# Patient Record
Sex: FEMALE | Race: WHITE | Employment: FULL TIME | URBAN - METROPOLITAN AREA
[De-identification: names, ages, dates, MRNs, and addresses within clinical notes are randomized per-mention and may not be internally consistent; named-entity substitution may affect disease eponyms.]

---

## 2017-03-09 ENCOUNTER — TELEPHONE (OUTPATIENT)
Dept: FAMILY MEDICINE CLINIC | Age: 62
End: 2017-03-09

## 2017-03-09 DIAGNOSIS — Z00.00 PHYSICAL EXAM: ICD-10-CM

## 2017-03-09 DIAGNOSIS — R73.02 IGT (IMPAIRED GLUCOSE TOLERANCE): Primary | ICD-10-CM

## 2017-03-09 DIAGNOSIS — Z12.5 SCREENING FOR PROSTATE CANCER: ICD-10-CM

## 2017-04-03 ENCOUNTER — TELEPHONE (OUTPATIENT)
Dept: FAMILY MEDICINE CLINIC | Age: 62
End: 2017-04-03

## 2019-08-21 ENCOUNTER — OFFICE VISIT (OUTPATIENT)
Dept: ORTHOPEDIC SURGERY | Age: 64
End: 2019-08-21
Payer: COMMERCIAL

## 2019-08-21 VITALS
WEIGHT: 192 LBS | HEART RATE: 76 BPM | HEIGHT: 67 IN | SYSTOLIC BLOOD PRESSURE: 147 MMHG | BODY MASS INDEX: 30.13 KG/M2 | DIASTOLIC BLOOD PRESSURE: 85 MMHG

## 2019-08-21 DIAGNOSIS — M23.91 INTERNAL DERANGEMENT OF RIGHT KNEE: ICD-10-CM

## 2019-08-21 DIAGNOSIS — M25.561 ACUTE PAIN OF RIGHT KNEE: Primary | ICD-10-CM

## 2019-08-21 PROCEDURE — 1036F TOBACCO NON-USER: CPT | Performed by: ORTHOPAEDIC SURGERY

## 2019-08-21 PROCEDURE — 1123F ACP DISCUSS/DSCN MKR DOCD: CPT | Performed by: ORTHOPAEDIC SURGERY

## 2019-08-21 PROCEDURE — 99203 OFFICE O/P NEW LOW 30 MIN: CPT | Performed by: ORTHOPAEDIC SURGERY

## 2019-08-21 PROCEDURE — 3017F COLORECTAL CA SCREEN DOC REV: CPT | Performed by: ORTHOPAEDIC SURGERY

## 2019-08-21 PROCEDURE — 1090F PRES/ABSN URINE INCON ASSESS: CPT | Performed by: ORTHOPAEDIC SURGERY

## 2019-08-21 PROCEDURE — 4040F PNEUMOC VAC/ADMIN/RCVD: CPT | Performed by: ORTHOPAEDIC SURGERY

## 2019-08-21 PROCEDURE — G8427 DOCREV CUR MEDS BY ELIG CLIN: HCPCS | Performed by: ORTHOPAEDIC SURGERY

## 2019-08-21 PROCEDURE — G8417 CALC BMI ABV UP PARAM F/U: HCPCS | Performed by: ORTHOPAEDIC SURGERY

## 2019-08-21 PROCEDURE — G8400 PT W/DXA NO RESULTS DOC: HCPCS | Performed by: ORTHOPAEDIC SURGERY

## 2019-08-21 RX ORDER — NAPROXEN SODIUM 220 MG
220 TABLET ORAL 2 TIMES DAILY WITH MEALS
COMMUNITY

## 2019-09-23 ENCOUNTER — TELEPHONE (OUTPATIENT)
Dept: ORTHOPEDIC SURGERY | Age: 64
End: 2019-09-23

## 2019-09-23 DIAGNOSIS — S83.241A TEAR OF MEDIAL MENISCUS OF RIGHT KNEE, CURRENT, UNSPECIFIED TEAR TYPE, INITIAL ENCOUNTER: Primary | ICD-10-CM

## 2019-09-27 ENCOUNTER — TELEPHONE (OUTPATIENT)
Dept: ORTHOPEDIC SURGERY | Age: 64
End: 2019-09-27

## 2021-11-12 ENCOUNTER — OFFICE VISIT (OUTPATIENT)
Dept: PULMONOLOGY | Age: 66
End: 2021-11-12
Payer: COMMERCIAL

## 2021-11-12 VITALS — OXYGEN SATURATION: 96 % | SYSTOLIC BLOOD PRESSURE: 134 MMHG | HEART RATE: 78 BPM | DIASTOLIC BLOOD PRESSURE: 74 MMHG

## 2021-11-12 DIAGNOSIS — R05.9 COUGH: Primary | ICD-10-CM

## 2021-11-12 DIAGNOSIS — R06.02 SHORTNESS OF BREATH: ICD-10-CM

## 2021-11-12 DIAGNOSIS — Z77.098 EXPOSURE TO CHEMICAL INHALATION: ICD-10-CM

## 2021-11-12 DIAGNOSIS — R05.3 CHRONIC COUGH: ICD-10-CM

## 2021-11-12 PROCEDURE — 1090F PRES/ABSN URINE INCON ASSESS: CPT | Performed by: INTERNAL MEDICINE

## 2021-11-12 PROCEDURE — G8427 DOCREV CUR MEDS BY ELIG CLIN: HCPCS | Performed by: INTERNAL MEDICINE

## 2021-11-12 PROCEDURE — G8484 FLU IMMUNIZE NO ADMIN: HCPCS | Performed by: INTERNAL MEDICINE

## 2021-11-12 PROCEDURE — 1036F TOBACCO NON-USER: CPT | Performed by: INTERNAL MEDICINE

## 2021-11-12 PROCEDURE — 4040F PNEUMOC VAC/ADMIN/RCVD: CPT | Performed by: INTERNAL MEDICINE

## 2021-11-12 PROCEDURE — G8400 PT W/DXA NO RESULTS DOC: HCPCS | Performed by: INTERNAL MEDICINE

## 2021-11-12 PROCEDURE — 3017F COLORECTAL CA SCREEN DOC REV: CPT | Performed by: INTERNAL MEDICINE

## 2021-11-12 PROCEDURE — 99204 OFFICE O/P NEW MOD 45 MIN: CPT | Performed by: INTERNAL MEDICINE

## 2021-11-12 PROCEDURE — G8421 BMI NOT CALCULATED: HCPCS | Performed by: INTERNAL MEDICINE

## 2021-11-12 PROCEDURE — 1123F ACP DISCUSS/DSCN MKR DOCD: CPT | Performed by: INTERNAL MEDICINE

## 2021-11-12 ASSESSMENT — ENCOUNTER SYMPTOMS
ABDOMINAL PAIN: 0
CONSTIPATION: 0
DIARRHEA: 0
NAUSEA: 0
SINUS PRESSURE: 0
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
## 2021-11-19 ENCOUNTER — HOSPITAL ENCOUNTER (OUTPATIENT)
Age: 66
Discharge: HOME | DRG: 193 | End: 2021-11-19
Payer: COMMERCIAL

## 2021-11-19 ENCOUNTER — HOSPITAL ENCOUNTER (OUTPATIENT)
Dept: GENERAL RADIOLOGY | Age: 66
Discharge: HOME | DRG: 193 | End: 2021-11-19
Payer: COMMERCIAL

## 2021-11-19 DIAGNOSIS — R05.9 COUGH: ICD-10-CM

## 2021-11-19 LAB
BASOPHILS ABSOLUTE: 0 K/UL (ref 0–0.2)
BASOPHILS RELATIVE PERCENT: 0.4 %
EOSINOPHILS ABSOLUTE: 0.3 K/UL (ref 0–0.6)
EOSINOPHILS RELATIVE PERCENT: 4.3 %
HCT VFR BLD CALC: 42.2 % (ref 36–48)
HEMOGLOBIN: 14 G/DL (ref 12–16)
LYMPHOCYTES ABSOLUTE: 1.1 K/UL (ref 1–5.1)
LYMPHOCYTES RELATIVE PERCENT: 15.8 %
MCH RBC QN AUTO: 28.5 PG (ref 26–34)
MCHC RBC AUTO-ENTMCNC: 33.2 G/DL (ref 31–36)
MCV RBC AUTO: 85.9 FL (ref 80–100)
MONOCYTES ABSOLUTE: 0.4 K/UL (ref 0–1.3)
MONOCYTES RELATIVE PERCENT: 6.4 %
NEUTROPHILS ABSOLUTE: 5.1 K/UL (ref 1.7–7.7)
NEUTROPHILS RELATIVE PERCENT: 73.1 %
PDW BLD-RTO: 14.3 % (ref 12.4–15.4)
PLATELET # BLD: 176 K/UL (ref 135–450)
PMV BLD AUTO: 10.4 FL (ref 5–10.5)
RBC # BLD: 4.92 M/UL (ref 4–5.2)
WBC # BLD: 6.9 K/UL (ref 4–11)

## 2021-11-19 PROCEDURE — 36415 COLL VENOUS BLD VENIPUNCTURE: CPT

## 2021-11-19 PROCEDURE — 82785 ASSAY OF IGE: CPT

## 2021-11-19 PROCEDURE — 85025 COMPLETE CBC W/AUTO DIFF WBC: CPT

## 2021-11-19 PROCEDURE — 71046 X-RAY EXAM CHEST 2 VIEWS: CPT

## 2021-11-19 PROCEDURE — 86003 ALLG SPEC IGE CRUDE XTRC EA: CPT

## 2021-11-20 ENCOUNTER — HOSPITAL ENCOUNTER (INPATIENT)
Age: 66
LOS: 2 days | Discharge: HOME | DRG: 193 | End: 2021-11-22
Attending: EMERGENCY MEDICINE | Admitting: FAMILY MEDICINE
Payer: COMMERCIAL

## 2021-11-20 ENCOUNTER — APPOINTMENT (OUTPATIENT)
Dept: CT IMAGING | Age: 66
DRG: 193 | End: 2021-11-20
Payer: COMMERCIAL

## 2021-11-20 DIAGNOSIS — R06.2 WHEEZING: ICD-10-CM

## 2021-11-20 DIAGNOSIS — J09.X2 INFLUENZA DUE TO AVIAN INFLUENZA A VIRUS SUBTYPE H3N2: ICD-10-CM

## 2021-11-20 DIAGNOSIS — E04.1 THYROID NODULE: ICD-10-CM

## 2021-11-20 DIAGNOSIS — R06.03 RESPIRATORY DISTRESS: Primary | ICD-10-CM

## 2021-11-20 PROBLEM — J96.00 ACUTE RESPIRATORY FAILURE (HCC): Status: ACTIVE | Noted: 2021-11-20

## 2021-11-20 LAB
A/G RATIO: 1.3 (ref 1.1–2.2)
ALBUMIN SERPL-MCNC: 4 G/DL (ref 3.4–5)
ALP BLD-CCNC: 107 U/L (ref 40–129)
ALT SERPL-CCNC: 90 U/L (ref 10–40)
ANION GAP SERPL CALCULATED.3IONS-SCNC: 13 MMOL/L (ref 3–16)
AST SERPL-CCNC: 108 U/L (ref 15–37)
BASOPHILS ABSOLUTE: 0 K/UL (ref 0–0.2)
BASOPHILS RELATIVE PERCENT: 0.4 %
BILIRUB SERPL-MCNC: 0.4 MG/DL (ref 0–1)
BUN BLDV-MCNC: 14 MG/DL (ref 7–20)
CALCIUM SERPL-MCNC: 9.7 MG/DL (ref 8.3–10.6)
CHLORIDE BLD-SCNC: 101 MMOL/L (ref 99–110)
CO2: 22 MMOL/L (ref 21–32)
CREAT SERPL-MCNC: 0.6 MG/DL (ref 0.6–1.2)
EOSINOPHILS ABSOLUTE: 0.3 K/UL (ref 0–0.6)
EOSINOPHILS RELATIVE PERCENT: 5.8 %
GFR AFRICAN AMERICAN: >60
GFR NON-AFRICAN AMERICAN: >60
GLUCOSE BLD-MCNC: 133 MG/DL (ref 70–99)
HCG QUALITATIVE: NEGATIVE
HCT VFR BLD CALC: 40.5 % (ref 36–48)
HEMOGLOBIN: 13.5 G/DL (ref 12–16)
LACTIC ACID, SEPSIS: 1.5 MMOL/L (ref 0.4–1.9)
LYMPHOCYTES ABSOLUTE: 0.4 K/UL (ref 1–5.1)
LYMPHOCYTES RELATIVE PERCENT: 8.5 %
MCH RBC QN AUTO: 28.1 PG (ref 26–34)
MCHC RBC AUTO-ENTMCNC: 33.3 G/DL (ref 31–36)
MCV RBC AUTO: 84.2 FL (ref 80–100)
MONOCYTES ABSOLUTE: 0.5 K/UL (ref 0–1.3)
MONOCYTES RELATIVE PERCENT: 10.4 %
NEUTROPHILS ABSOLUTE: 3.6 K/UL (ref 1.7–7.7)
NEUTROPHILS RELATIVE PERCENT: 74.9 %
PDW BLD-RTO: 14.4 % (ref 12.4–15.4)
PLATELET # BLD: 165 K/UL (ref 135–450)
PMV BLD AUTO: 9.3 FL (ref 5–10.5)
POTASSIUM REFLEX MAGNESIUM: 4 MMOL/L (ref 3.5–5.1)
PRO-BNP: 70 PG/ML (ref 0–124)
PROCALCITONIN: 0.12 NG/ML (ref 0–0.15)
RBC # BLD: 4.81 M/UL (ref 4–5.2)
SODIUM BLD-SCNC: 136 MMOL/L (ref 136–145)
TOTAL PROTEIN: 7.1 G/DL (ref 6.4–8.2)
TROPONIN: <0.01 NG/ML
WBC # BLD: 4.8 K/UL (ref 4–11)

## 2021-11-20 PROCEDURE — 99284 EMERGENCY DEPT VISIT MOD MDM: CPT

## 2021-11-20 PROCEDURE — 6360000004 HC RX CONTRAST MEDICATION: Performed by: PHYSICIAN ASSISTANT

## 2021-11-20 PROCEDURE — 84703 CHORIONIC GONADOTROPIN ASSAY: CPT

## 2021-11-20 PROCEDURE — 36415 COLL VENOUS BLD VENIPUNCTURE: CPT

## 2021-11-20 PROCEDURE — 6360000002 HC RX W HCPCS: Performed by: PHYSICIAN ASSISTANT

## 2021-11-20 PROCEDURE — 94761 N-INVAS EAR/PLS OXIMETRY MLT: CPT

## 2021-11-20 PROCEDURE — 6360000002 HC RX W HCPCS: Performed by: FAMILY MEDICINE

## 2021-11-20 PROCEDURE — 1200000000 HC SEMI PRIVATE

## 2021-11-20 PROCEDURE — 94640 AIRWAY INHALATION TREATMENT: CPT

## 2021-11-20 PROCEDURE — 80053 COMPREHEN METABOLIC PANEL: CPT

## 2021-11-20 PROCEDURE — 87040 BLOOD CULTURE FOR BACTERIA: CPT

## 2021-11-20 PROCEDURE — 6370000000 HC RX 637 (ALT 250 FOR IP): Performed by: FAMILY MEDICINE

## 2021-11-20 PROCEDURE — 85025 COMPLETE CBC W/AUTO DIFF WBC: CPT

## 2021-11-20 PROCEDURE — 6370000000 HC RX 637 (ALT 250 FOR IP): Performed by: EMERGENCY MEDICINE

## 2021-11-20 PROCEDURE — 6370000000 HC RX 637 (ALT 250 FOR IP): Performed by: PHYSICIAN ASSISTANT

## 2021-11-20 PROCEDURE — 71260 CT THORAX DX C+: CPT

## 2021-11-20 PROCEDURE — 84145 PROCALCITONIN (PCT): CPT

## 2021-11-20 PROCEDURE — 83880 ASSAY OF NATRIURETIC PEPTIDE: CPT

## 2021-11-20 PROCEDURE — 93005 ELECTROCARDIOGRAM TRACING: CPT | Performed by: EMERGENCY MEDICINE

## 2021-11-20 PROCEDURE — 84484 ASSAY OF TROPONIN QUANT: CPT

## 2021-11-20 PROCEDURE — 2580000003 HC RX 258: Performed by: FAMILY MEDICINE

## 2021-11-20 PROCEDURE — 83605 ASSAY OF LACTIC ACID: CPT

## 2021-11-20 PROCEDURE — 96374 THER/PROPH/DIAG INJ IV PUSH: CPT

## 2021-11-20 PROCEDURE — U0003 INFECTIOUS AGENT DETECTION BY NUCLEIC ACID (DNA OR RNA); SEVERE ACUTE RESPIRATORY SYNDROME CORONAVIRUS 2 (SARS-COV-2) (CORONAVIRUS DISEASE [COVID-19]), AMPLIFIED PROBE TECHNIQUE, MAKING USE OF HIGH THROUGHPUT TECHNOLOGIES AS DESCRIBED BY CMS-2020-01-R: HCPCS

## 2021-11-20 PROCEDURE — U0005 INFEC AGEN DETEC AMPLI PROBE: HCPCS

## 2021-11-20 PROCEDURE — 84443 ASSAY THYROID STIM HORMONE: CPT

## 2021-11-20 RX ORDER — SODIUM CHLORIDE 9 MG/ML
25 INJECTION, SOLUTION INTRAVENOUS PRN
Status: DISCONTINUED | OUTPATIENT
Start: 2021-11-20 | End: 2021-11-22 | Stop reason: HOSPADM

## 2021-11-20 RX ORDER — BUDESONIDE AND FORMOTEROL FUMARATE DIHYDRATE 160; 4.5 UG/1; UG/1
2 AEROSOL RESPIRATORY (INHALATION) 2 TIMES DAILY
Status: DISCONTINUED | OUTPATIENT
Start: 2021-11-20 | End: 2021-11-21

## 2021-11-20 RX ORDER — IPRATROPIUM BROMIDE AND ALBUTEROL SULFATE 2.5; .5 MG/3ML; MG/3ML
1 SOLUTION RESPIRATORY (INHALATION)
Status: DISCONTINUED | OUTPATIENT
Start: 2021-11-20 | End: 2021-11-20

## 2021-11-20 RX ORDER — IPRATROPIUM BROMIDE AND ALBUTEROL SULFATE 2.5; .5 MG/3ML; MG/3ML
1 SOLUTION RESPIRATORY (INHALATION) ONCE
Status: COMPLETED | OUTPATIENT
Start: 2021-11-20 | End: 2021-11-20

## 2021-11-20 RX ORDER — ACETAMINOPHEN 500 MG
1000 TABLET ORAL ONCE
Status: COMPLETED | OUTPATIENT
Start: 2021-11-20 | End: 2021-11-20

## 2021-11-20 RX ORDER — ALBUTEROL SULFATE 90 UG/1
2 AEROSOL, METERED RESPIRATORY (INHALATION) 4 TIMES DAILY
Status: DISCONTINUED | OUTPATIENT
Start: 2021-11-20 | End: 2021-11-21

## 2021-11-20 RX ORDER — ACETAMINOPHEN 325 MG/1
650 TABLET ORAL EVERY 6 HOURS PRN
Status: DISCONTINUED | OUTPATIENT
Start: 2021-11-20 | End: 2021-11-22 | Stop reason: HOSPADM

## 2021-11-20 RX ORDER — METHYLPREDNISOLONE SODIUM SUCCINATE 40 MG/ML
40 INJECTION, POWDER, LYOPHILIZED, FOR SOLUTION INTRAMUSCULAR; INTRAVENOUS DAILY
Status: DISCONTINUED | OUTPATIENT
Start: 2021-11-21 | End: 2021-11-21

## 2021-11-20 RX ORDER — ACETAMINOPHEN 650 MG/1
650 SUPPOSITORY RECTAL EVERY 6 HOURS PRN
Status: DISCONTINUED | OUTPATIENT
Start: 2021-11-20 | End: 2021-11-22 | Stop reason: HOSPADM

## 2021-11-20 RX ORDER — SODIUM CHLORIDE 0.9 % (FLUSH) 0.9 %
5-40 SYRINGE (ML) INJECTION EVERY 12 HOURS SCHEDULED
Status: DISCONTINUED | OUTPATIENT
Start: 2021-11-20 | End: 2021-11-22 | Stop reason: HOSPADM

## 2021-11-20 RX ORDER — SODIUM CHLORIDE 0.9 % (FLUSH) 0.9 %
5-40 SYRINGE (ML) INJECTION PRN
Status: DISCONTINUED | OUTPATIENT
Start: 2021-11-20 | End: 2021-11-22 | Stop reason: HOSPADM

## 2021-11-20 RX ORDER — POLYETHYLENE GLYCOL 3350 17 G/17G
17 POWDER, FOR SOLUTION ORAL DAILY PRN
Status: DISCONTINUED | OUTPATIENT
Start: 2021-11-20 | End: 2021-11-22 | Stop reason: HOSPADM

## 2021-11-20 RX ORDER — ONDANSETRON 4 MG/1
4 TABLET, ORALLY DISINTEGRATING ORAL EVERY 8 HOURS PRN
Status: DISCONTINUED | OUTPATIENT
Start: 2021-11-20 | End: 2021-11-22 | Stop reason: HOSPADM

## 2021-11-20 RX ORDER — METHYLPREDNISOLONE SODIUM SUCCINATE 125 MG/2ML
125 INJECTION, POWDER, LYOPHILIZED, FOR SOLUTION INTRAMUSCULAR; INTRAVENOUS ONCE
Status: COMPLETED | OUTPATIENT
Start: 2021-11-20 | End: 2021-11-20

## 2021-11-20 RX ORDER — ONDANSETRON 2 MG/ML
4 INJECTION INTRAMUSCULAR; INTRAVENOUS EVERY 6 HOURS PRN
Status: DISCONTINUED | OUTPATIENT
Start: 2021-11-20 | End: 2021-11-22 | Stop reason: HOSPADM

## 2021-11-20 RX ADMIN — ENOXAPARIN SODIUM 40 MG: 100 INJECTION SUBCUTANEOUS at 21:14

## 2021-11-20 RX ADMIN — Medication 2 PUFF: at 21:53

## 2021-11-20 RX ADMIN — ALBUTEROL SULFATE 5 MG: 2.5 SOLUTION RESPIRATORY (INHALATION) at 14:23

## 2021-11-20 RX ADMIN — IPRATROPIUM BROMIDE AND ALBUTEROL SULFATE 1 AMPULE: .5; 3 SOLUTION RESPIRATORY (INHALATION) at 14:23

## 2021-11-20 RX ADMIN — ACETAMINOPHEN 1000 MG: 500 TABLET ORAL at 16:17

## 2021-11-20 RX ADMIN — ACETAMINOPHEN 650 MG: 325 TABLET ORAL at 21:14

## 2021-11-20 RX ADMIN — METHYLPREDNISOLONE SODIUM SUCCINATE 125 MG: 125 INJECTION, POWDER, FOR SOLUTION INTRAMUSCULAR; INTRAVENOUS at 14:31

## 2021-11-20 RX ADMIN — IOPAMIDOL 75 ML: 755 INJECTION, SOLUTION INTRAVENOUS at 14:54

## 2021-11-20 RX ADMIN — Medication 10 ML: at 21:15

## 2021-11-20 ASSESSMENT — PAIN DESCRIPTION - LOCATION: LOCATION: HEAD

## 2021-11-20 ASSESSMENT — ENCOUNTER SYMPTOMS
DIARRHEA: 0
ABDOMINAL PAIN: 0
NAUSEA: 0
CONSTIPATION: 0
COUGH: 1
BACK PAIN: 0
VOMITING: 0
COLOR CHANGE: 0
CHEST TIGHTNESS: 1
WHEEZING: 1
SHORTNESS OF BREATH: 1

## 2021-11-20 ASSESSMENT — PAIN DESCRIPTION - PROGRESSION
CLINICAL_PROGRESSION: NOT CHANGED
CLINICAL_PROGRESSION: NOT CHANGED

## 2021-11-20 ASSESSMENT — PAIN SCALES - GENERAL
PAINLEVEL_OUTOF10: 5
PAINLEVEL_OUTOF10: 5
PAINLEVEL_OUTOF10: 0
PAINLEVEL_OUTOF10: 5
PAINLEVEL_OUTOF10: 6
PAINLEVEL_OUTOF10: 0

## 2021-11-20 ASSESSMENT — PAIN DESCRIPTION - PAIN TYPE: TYPE: ACUTE PAIN

## 2021-11-20 ASSESSMENT — PAIN DESCRIPTION - DESCRIPTORS: DESCRIPTORS: HEADACHE

## 2021-11-20 ASSESSMENT — PAIN DESCRIPTION - FREQUENCY: FREQUENCY: CONTINUOUS

## 2021-11-20 ASSESSMENT — PAIN DESCRIPTION - ORIENTATION: ORIENTATION: MID

## 2021-11-20 ASSESSMENT — PAIN DESCRIPTION - ONSET: ONSET: ON-GOING

## 2021-11-20 NOTE — ED NOTES
Bed: 07  Expected date:   Expected time:   Means of arrival:   Comments:  [address], [facility]  11/20/21 1417

## 2021-11-20 NOTE — ED NOTES
Pt c/o headache. Verbal order for 1G tylenol per Dr. Fabian Mcfarland.       Davi St RN  11/20/21 0999

## 2021-11-20 NOTE — ED NOTES
Report given to [facility] , awaiting transport     Kingston Diaz RN  11/20/21 5354

## 2021-11-20 NOTE — ED PROVIDER NOTES
[facility]        Pt Name: Josy Lion  MRN: [id number]  [place] 1955  Date of evaluation: 11/20/2021  Provider: ANGELA Carreno  PCP: Purvi Zavala MD  Note Started: 3:22 PM EST        I have seen and evaluated this patient with my supervising physician Claudette Villavicencio MD.    [address] [place] [address]       Chief Complaint   Patient presents with    Shortness of Breath     Pt reports cough, fever, SOB since yesterday morning. HISTORY OF PRESENT ILLNESS   (Location, Timing/Onset, Context/Setting, Quality, Duration, Modifying Factors, Severity, Associated Signs and Symptoms)  Note limiting factors. Chief Complaint: HUGH Lion is a 78 y.o. female with past medical history of thyroid disorder who presents the ED with complaint of shortness of breath. Patient states she has had a cough she states been ongoing for about a month. Patient states she saw Dr. Eliot Ramos yesterday for chronic cough. Patient states he had chest x-ray done and had blood work ordered. Patient states she had worsening cough and shortness of breath so came to the ED today for further evaluation and treatment. She denies any sick contacts or recent travel but states she does work for the [employer]. Denies any known exposure to COVID-19 or the coronavirus. States she has been vaccinated for Covid with maternal vaccine x2. Has not yet received her booster. She states she has a nonproductive cough that is been ongoing for the past month. She denies any hemoptysis. Denies fever, chills, rashes/lesions, headache, lightheadedness/dizziness, syncope, near syncope, chest pain, orthopnea, pleuritic pain, pedal edema or calf tenderness. She states she does have fairly significant shortness of breath. States she feels like she is wheezing. She denies any history of asthma or COPD. States she is never been a smoker. Denies any history of DVT or PE. Denies recent travel, trips, surgery or immobilization. Nursing Notes were all reviewed and agreed with or any disagreements were addressed in the HPI. REVIEW OF SYSTEMS    (2-9 systems for level 4, 10 or more for level 5)     Review of Systems   Constitutional: Negative for activity change, appetite change, chills, diaphoresis, fatigue and fever. Respiratory: Positive for cough, chest tightness, shortness of breath and wheezing. Cardiovascular: Negative. Negative for chest pain, palpitations and leg swelling. Gastrointestinal: Negative for abdominal pain, constipation, diarrhea, nausea and vomiting. Genitourinary: Negative for decreased urine volume, difficulty urinating, dysuria, flank pain, frequency, hematuria and urgency. Musculoskeletal: Negative for arthralgias, back pain, myalgias, neck pain and neck stiffness. Skin: Negative for color change, pallor, rash and wound. Neurological: Negative for dizziness, light-headedness and headaches. Positives and Pertinent negatives as per HPI. Except as noted above in the ROS, all other systems were reviewed and negative. PAST MEDICAL HISTORY     Past Medical History:   Diagnosis Date    Thyroid disorder          SURGICAL HISTORY     Past Surgical History:   Procedure Laterality Date    ANKLE FRACTURE SURGERY Right 12/2004    THYROIDECTOMY  1988         CURRENTMEDICATIONS       Previous Medications    NAPROXEN SODIUM (ALEVE) 220 MG TABLET    Take 220 mg by mouth 2 times daily (with meals)         ALLERGIES     Sulfa antibiotics    FAMILYHISTORY       Family History   Problem Relation Age of Onset    High Blood Pressure Mother     High Blood Pressure Father     High Cholesterol Father           SOCIAL HISTORY       Social History     Tobacco Use    Smoking status: Never Smoker    Smokeless tobacco: Never Used   Substance Use Topics    Alcohol use:  Yes     Alcohol/week: 0.0 standard drinks Comment: social    Drug use: No       SCREENINGS             PHYSICAL EXAM    (up to 7 for level 4, 8 or more for level 5)     ED Triage Vitals [11/20/21 1325]   BP Temp Temp Source Pulse Resp SpO2 Height Weight   114/87 98.1 °F (36.7 °C) Oral 121 18 92 % 5' 7\" (1.702 m) 194 lb (88 kg)       Physical Exam  Constitutional:       General: She is not in acute distress. Appearance: She is well-developed. She is not ill-appearing, toxic-appearing or diaphoretic. HENT:      Head: Normocephalic and atraumatic. Right Ear: External ear normal.      Left Ear: External ear normal.   Eyes:      General:         Right eye: No discharge. Left eye: No discharge. Conjunctiva/sclera: Conjunctivae normal.   Cardiovascular:      Rate and Rhythm: Regular rhythm. Tachycardia present. Pulses: Normal pulses. Heart sounds: Normal heart sounds. No murmur heard. No friction rub. No gallop. Comments: 2+ radial pulses bilaterally. No pedal edema. No calf tenderness. No JVD. Pulmonary:      Effort: Respiratory distress present. Breath sounds: No stridor. Wheezing present. No rhonchi or rales. Comments: Tachypnea noted. Does have some conversational dyspnea. Oxygen saturation 92% on room air. Diffuse wheezing noted throughout. No rales or rhonchi. Chest:      Chest wall: No tenderness. Abdominal:      General: Abdomen is flat. Bowel sounds are normal. There is no distension. Palpations: Abdomen is soft. There is no mass. Tenderness: There is no abdominal tenderness. There is no right CVA tenderness, left CVA tenderness, guarding or rebound. Hernia: No hernia is present. Musculoskeletal:         General: Normal range of motion. Cervical back: Normal range of motion and neck supple. Skin:     General: Skin is warm and dry. Coloration: Skin is not pale. Findings: No erythema or rash.    Neurological:      Mental Status: She is alert and oriented to person, place, and time. Psychiatric:         Behavior: Behavior normal.         DIAGNOSTIC RESULTS   LABS:    Labs Reviewed   CBC WITH AUTO DIFFERENTIAL - Abnormal; Notable for the following components:       Result Value    Lymphocytes Absolute 0.4 (*)     All other components within normal limits    Narrative:     Performed at:  [facility]  [address], [place]   Phone [phone]   COMPREHENSIVE METABOLIC PANEL W/ REFLEX TO MG FOR LOW K - Abnormal; Notable for the following components:    Glucose 133 (*)     ALT 90 (*)      (*)     All other components within normal limits    Narrative:     Performed at:  [facility] [address], [address]   Phone [phone]   CULTURE, BLOOD 1   CULTURE, BLOOD 2   TROPONIN    Narrative:     Performed at:  [facility] [address], [place]   Phone [phone]   BRAIN NATRIURETIC PEPTIDE    Narrative:     Performed at:  [facility] [address], [place] ?[place]   Phone [phone]   PROCALCITONIN    Narrative:     Performed at:  [facility] [address], [place] ?[place]   Phone [phone]   HCG, SERUM, QUALITATIVE    Narrative:     Performed at:  [facility]  [address], [place] ?[place]   Phone [phone]   LACTATE, [place]    Narrative:     Performed at:  [facility] [address]   Phone [phone]   [address], [place]   [phone]       When ordered only abnormal lab results are displayed. All other labs were within normal range or not returned as of this dictation. EKG:  When ordered, EKG's are interpreted by the Emergency Department Physician in the absence of a cardiologist. sodium (SOLU-MEDROL) injection 125 mg (125 mg IntraVENous Given 11/20/21 1431)   ipratropium-albuterol (DUONEB) nebulizer solution 1 ampule (1 ampule Inhalation Given 11/20/21 1423)   albuterol (PROVENTIL) nebulizer solution 5 mg (5 mg Nebulization Given 11/20/21 1423)   iopamidol (ISOVUE-370) 76 % injection 75 mL (75 mLs IntraVENous Given 11/20/21 6684)           Patient is a 58-year-old female who presents to the ED with complaint of cough and shortness of breath. Has had chronic cough for the past month. Increasing shortness of breath over the past 24 hours and came to the ED for further evaluation and treatment. Upon examination patient tachycardic, tachypneic and have an oxygen saturation 92% on room air. Diffuse wheezing noted. She has some conversational dyspnea. Patient had IV established and was given Solu-Medrol breathing treatments here in the ED. With ambulation from the rapid assessment zone 2 to the room she dropped down to 90% with ambulation. CBC showed normal white count, hemoglobin and platelets. Lymphopenia 0.4. CMP showed ALT of 90 and AST of 108. Troponin was normal.  BNP unremarkable. Procalcitonin normal.  Lactic acid normal.  Pregnancy was negative. EKG interpreted by attending. CT of the chest obtained given the fact the patient had chest x-ray done outpatient yesterday which was unremarkable. CT of the chest showed no evidence of pulmonary embolism. No acute pulmonary findings noted. Calcified colonic homeless disease noted. Thyroid nodule incidentally noted. Given patient's respiratory distress with hypoxia with ambulation, wheezing and chest tightness believe patient would benefit from mission for further evaluation and treatment. Covid swab obtained and pending at this time. Case discussed with hospitalist service for admission. FINAL IMPRESSION      1. Respiratory distress    2. Wheezing    3.  Thyroid nodule          DISPOSITION/PLAN   DISPOSITION Decision To Admit 11/20/2021 03:22:21 PM      PATIENT REFERRED TO:  No follow-up provider specified.     DISCHARGE MEDICATIONS:  New Prescriptions    No medications on file       DISCONTINUED MEDICATIONS:  Discontinued Medications    No medications on file              (Please note that portions of this note were completed with a voice recognition program.  Efforts were made to edit the dictations but occasionally words are mis-transcribed.)    ANGELA Glass (electronically signed)          ANGELA Lawler  11/20/21 9611

## 2021-11-20 NOTE — ED NOTES
Pt placed on 2LNC d/t dips down to 88% RA at times. No resp distress noted.  Up to 94% [address]  RN  11/20/21 7574

## 2021-11-21 PROBLEM — E66.9 OBESITY (BMI 30-39.9): Status: ACTIVE | Noted: 2021-11-21

## 2021-11-21 PROBLEM — E04.1 LEFT THYROID NODULE: Status: ACTIVE | Noted: 2021-11-21

## 2021-11-21 PROBLEM — J20.8 ACUTE VIRAL BRONCHITIS: Status: ACTIVE | Noted: 2021-11-20

## 2021-11-21 LAB
EKG ATRIAL RATE: 118 BPM
EKG DIAGNOSIS: NORMAL
EKG P AXIS: 78 DEGREES
EKG P-R INTERVAL: 134 MS
EKG Q-T INTERVAL: 320 MS
EKG QRS DURATION: 78 MS
EKG QTC CALCULATION (BAZETT): 448 MS
EKG R AXIS: 84 DEGREES
EKG T AXIS: 65 DEGREES
EKG VENTRICULAR RATE: 118 BPM
SARS-COV-2: NOT DETECTED
TROPONIN: <0.01 NG/ML
TROPONIN: <0.01 NG/ML
TSH REFLEX: 0.57 UIU/ML (ref 0.27–4.2)

## 2021-11-21 PROCEDURE — 6370000000 HC RX 637 (ALT 250 FOR IP): Performed by: INTERNAL MEDICINE

## 2021-11-21 PROCEDURE — 1200000000 HC SEMI PRIVATE

## 2021-11-21 PROCEDURE — 93010 ELECTROCARDIOGRAM REPORT: CPT | Performed by: INTERNAL MEDICINE

## 2021-11-21 PROCEDURE — 84484 ASSAY OF TROPONIN QUANT: CPT

## 2021-11-21 PROCEDURE — 6360000002 HC RX W HCPCS: Performed by: FAMILY MEDICINE

## 2021-11-21 PROCEDURE — 2580000003 HC RX 258: Performed by: FAMILY MEDICINE

## 2021-11-21 PROCEDURE — 6360000002 HC RX W HCPCS: Performed by: INTERNAL MEDICINE

## 2021-11-21 PROCEDURE — 94640 AIRWAY INHALATION TREATMENT: CPT

## 2021-11-21 PROCEDURE — 6370000000 HC RX 637 (ALT 250 FOR IP): Performed by: FAMILY MEDICINE

## 2021-11-21 PROCEDURE — 0202U NFCT DS 22 TRGT SARS-COV-2: CPT

## 2021-11-21 PROCEDURE — 36415 COLL VENOUS BLD VENIPUNCTURE: CPT

## 2021-11-21 RX ORDER — FLUTICASONE PROPIONATE 220 UG/1
1 AEROSOL, METERED RESPIRATORY (INHALATION) 2 TIMES DAILY
Status: DISCONTINUED | OUTPATIENT
Start: 2021-11-21 | End: 2021-11-22 | Stop reason: HOSPADM

## 2021-11-21 RX ORDER — METHYLPREDNISOLONE SODIUM SUCCINATE 40 MG/ML
40 INJECTION, POWDER, LYOPHILIZED, FOR SOLUTION INTRAMUSCULAR; INTRAVENOUS EVERY 6 HOURS
Status: DISCONTINUED | OUTPATIENT
Start: 2021-11-21 | End: 2021-11-22 | Stop reason: HOSPADM

## 2021-11-21 RX ORDER — ALBUTEROL SULFATE 90 UG/1
2 AEROSOL, METERED RESPIRATORY (INHALATION) 2 TIMES DAILY
Status: DISCONTINUED | OUTPATIENT
Start: 2021-11-21 | End: 2021-11-21

## 2021-11-21 RX ORDER — ALBUTEROL SULFATE 90 UG/1
2 AEROSOL, METERED RESPIRATORY (INHALATION) EVERY 4 HOURS PRN
Status: DISCONTINUED | OUTPATIENT
Start: 2021-11-21 | End: 2021-11-22 | Stop reason: HOSPADM

## 2021-11-21 RX ORDER — PROMETHAZINE HYDROCHLORIDE AND CODEINE PHOSPHATE 6.25; 1 MG/5ML; MG/5ML
5 SYRUP ORAL EVERY 4 HOURS PRN
Status: DISCONTINUED | OUTPATIENT
Start: 2021-11-21 | End: 2021-11-22 | Stop reason: HOSPADM

## 2021-11-21 RX ADMIN — Medication 2 PUFF: at 09:43

## 2021-11-21 RX ADMIN — METHYLPREDNISOLONE SODIUM SUCCINATE 40 MG: 40 INJECTION, POWDER, FOR SOLUTION INTRAMUSCULAR; INTRAVENOUS at 15:21

## 2021-11-21 RX ADMIN — ACETAMINOPHEN 650 MG: 325 TABLET ORAL at 16:08

## 2021-11-21 RX ADMIN — Medication 10 ML: at 08:22

## 2021-11-21 RX ADMIN — Medication 1 PUFF: at 20:47

## 2021-11-21 RX ADMIN — Medication 5 ML: at 20:51

## 2021-11-21 RX ADMIN — METHYLPREDNISOLONE SODIUM SUCCINATE 40 MG: 40 INJECTION, POWDER, FOR SOLUTION INTRAMUSCULAR; INTRAVENOUS at 20:51

## 2021-11-21 RX ADMIN — METHYLPREDNISOLONE SODIUM SUCCINATE 40 MG: 40 INJECTION, POWDER, FOR SOLUTION INTRAMUSCULAR; INTRAVENOUS at 08:22

## 2021-11-21 RX ADMIN — Medication 10 ML: at 20:52

## 2021-11-21 RX ADMIN — ENOXAPARIN SODIUM 40 MG: 100 INJECTION SUBCUTANEOUS at 08:22

## 2021-11-21 ASSESSMENT — PAIN SCALES - GENERAL
PAINLEVEL_OUTOF10: 0
PAINLEVEL_OUTOF10: 4
PAINLEVEL_OUTOF10: 0
PAINLEVEL_OUTOF10: 0
PAINLEVEL_OUTOF10: 2

## 2021-11-21 ASSESSMENT — PAIN DESCRIPTION - PROGRESSION
CLINICAL_PROGRESSION: NOT CHANGED

## 2021-11-21 NOTE — ED PROVIDER NOTES
As physician-in-triage, I performed a medical screening history and physical exam on Fer Kelly. I also cared for and evaluated the patient in conjunction with the ED Advanced Practice Provider. All diagnostic, treatment, and disposition decisions were made by myself in conjunction with the advanced practice provider. For all further details of the patient's emergency department visit, please see the advanced practice provider's documentation. Patient presents to the ER for evaluation of underlying acute on subacute wheezing and cough, with persistent expiratory wheezing cough, prior vaccination with Materna. Persistent wheezing cough, borderline hypoxia and tachycardia. No evidence of PE no ACS no evidence of decompensated congestive heart failure. Persistent borderline hypoxia with significant expiratory wheezing. Patient will be admitted for evaluation of borderline hypoxia possible COVID-19 and need for continued respiratory treatments.       Impression: Acute dyspnea, tachycardia, hypoxia,     Vernie Dubin, MD  41/23/57 2959

## 2021-11-21 NOTE — PLAN OF CARE
Problem: Falls - Risk of:  Goal: Will remain free from falls  Description: Will remain free from falls  11/21/2021 1104 by Cristhian Viveros RN  Outcome: Ongoing  11/20/2021 2143 by Migue Gage RN  Outcome: Ongoing  Goal: Absence of physical injury  Description: Absence of physical injury  11/21/2021 1104 by Cristhian Viveros RN  Outcome: Ongoing  11/20/2021 2143 by Migue Gage RN  Outcome: Ongoing   Low risk. Pt will call out foe help if needed      Problem: Breathing Pattern - Ineffective:  Goal: Ability to achieve and maintain a regular respiratory rate will improve  Description: Ability to achieve and maintain a regular respiratory rate will improve  Outcome: Ongoing   Pt gets SOB and desats some with cough     POC discussed. Called lab to make sure respiratory panel was in process, they said yes it was,.     Handoff given to nightshift on  room 5552 where Pt was transferred to via wheelchair

## 2021-11-21 NOTE — ACP (ADVANCE CARE PLANNING)
Advanced Care Planning Note. Purpose of Encounter: Advanced care planning in light of acute viral bronchitis  Parties In Attendance: Patient  Decisional Capacity: Yes  Subjective: Patient with cough and MCGINNIS  Objective: Cr 0.6 on   Goals of Care Determination: Patient wants full support (CPR, vent, surgery, HD, trach, PEG)  Plan:  IV steroids, Echo, troponin, respiratory viral panel  Code Status: Full code   Time spent on Advanced care Plannin minutes  Advanced Care Planning Documents: Completed advanced directives on chart,  is the POA.     Jonelle Fatima MD  2021 5:50 PM

## 2021-11-21 NOTE — H&P
HOSPITALISTS HISTORY AND PHYSICAL    11/20/2021 10:14 PM    Patient Information:  Shwetha Kinsey is a 78 y.o. female [id number]  PCP:  Alessandro Bee MD (Tel: [phone] )    Chief complaint:    Chief Complaint   Patient presents with    Shortness of Breath     Pt reports cough, fever, SOB since yesterday morning. History of Present Illness:  Toshia Briceno is a 78 y.o. female presented with c/o fever, cough, and dyspnea on going for one day. Chest CT is neg for PE. The pt is tachycardic with ambulation and is hypoxic in RA. Is placed on 2 L O2 via NC . She has been given breathing treatments and solumedrol. COVID testing is pending  Denies fever, chest pain , abdominal pain, n/v/d/c  REVIEW OF SYSTEMS:   Constitutional: Negative for fever,chills or night sweats  ENT: Negative for rhinorrhea, epistaxis, hoarseness, sore throat. Respiratory: Negative for shortness of breath,wheezing  Cardiovascular: Negative for chest pain, palpitations   Gastrointestinal: Negative for nausea, vomiting, diarrhea  Genitourinary: Negative for polyuria, dysuria   Hematologic/Lymphatic: Negative for bleeding tendency, easy bruising  Musculoskeletal: Negative for myalgias and arthralgias  Neurologic: Negative for confusion,dysarthria. Skin: Negative for itching,rash  Psychiatric: Negative for depression,anxiety, agitation. Endocrine: Negative for polydipsia,polyuria,heat /cold intolerance. Past Medical History:   has a past medical history of Thyroid disorder. Past Surgical History:   has a past surgical history that includes Thyroidectomy (1988) and Ankle fracture surgery (Right, 12/2004). Medications:  No current facility-administered medications on file prior to encounter.      Current Outpatient Medications on File Prior to Encounter   Medication Sig Dispense Refill    naproxen sodium (ALEVE) 220 MG tablet Take 220 mg by mouth 2 times daily (with meals)       Current Facility-Administered Medications   Medication Dose Route Frequency Provider Last Rate Last Admin    [START ON 11/21/2021] methylPREDNISolone sodium (SOLU-MEDROL) injection 40 mg  40 mg IntraVENous Daily Fred Weinberg MD        sodium chloride flush 0.9 % injection 5-40 mL  5-40 mL IntraVENous 2 times per day Fred Weinberg MD   10 mL at 11/20/21 2115    sodium chloride flush 0.9 % injection 5-40 mL  5-40 mL IntraVENous PRN Fred Weinberg MD        0.9 % sodium chloride infusion  25 mL IntraVENous PRN Fred Weinberg MD        enoxaparin (LOVENOX) injection 40 mg  40 mg SubCUTAneous Daily Fred Weinberg MD   40 mg at 11/20/21 2114    ondansetron (ZOFRAN-ODT) disintegrating tablet 4 mg  4 mg Oral Q8H PRN Fred Weinberg MD        Or    ondansetron (ZOFRAN) injection 4 mg  4 mg IntraVENous Q6H PRN Fred Weinberg MD        polyethylene glycol (GLYCOLAX) packet 17 g  17 g Oral Daily PRN Fred Weinberg MD        acetaminophen (TYLENOL) tablet 650 mg  650 mg Oral Q6H PRN Fred Weinberg MD   650 mg at 11/20/21 2114    Or    acetaminophen (TYLENOL) suppository 650 mg  650 mg Rectal Q6H PRN Fred Weinberg MD        budesonide-formoterol (SYMBICORT) 160-4.5 MCG/ACT inhaler 2 puff  2 puff Inhalation BID Fred Weinberg MD   2 puff at 11/20/21 2153    albuterol sulfate  (90 Base) MCG/ACT inhaler 2 puff  2 puff Inhalation 4x daily Fred Weinberg MD   2 puff at 11/20/21 2153    ipratropium (ATROVENT HFA) 17 MCG/ACT inhaler 2 puff  2 puff Inhalation 4x daily Fred Weinberg MD   2 puff at 11/20/21 2153       Allergies: Allergies   Allergen Reactions    Sulfa Antibiotics Rash        Social History:   reports that she has never smoked. She has never used smokeless tobacco. She reports current alcohol use. She reports that she does not use drugs.      Family History:  family history includes High Blood Pressure in her father and mother; High Cholesterol in her father. ,     Physical Exam:  BP (!) 155/84   Pulse 97   Temp 97.5 °F (36.4 °C) (Temporal)   Resp 14   Ht 5' 7\" (1.702 m)   Wt 194 lb (88 kg)   SpO2 91%   BMI 30.38 kg/m²     General appearance:  Appears comfortable. Well nourished  Eyes: Sclera clear, pupils equal  ENT: Moist mucus membranes, no thrush. Trachea midline. Cardiovascular: Regular rhythm, normal S1, S2. No murmur, gallop, rub. No edema in lower extremities  Respiratory: Clear to auscultation bilaterally, no wheeze, good inspiratory effort  Gastrointestinal: Abdomen soft, non-tender, not distended, normal bowel sounds  Musculoskeletal: No cyanosis in digits, neck supple  Neurology: Cranial nerves grossly intact. Alert and oriented in time, place and person. No speech or motor deficits  Psychiatry: Appropriate affect. Not agitated  Skin: Warm, dry, normal turgor, no rash    Labs:  CBC:   Lab Results   Component Value Date    WBC 4.8 11/20/2021    RBC 4.81 11/20/2021    HGB 13.5 11/20/2021    HCT 40.5 11/20/2021    MCV 84.2 11/20/2021    MCH 28.1 11/20/2021    MCHC 33.3 11/20/2021    RDW 14.4 11/20/2021     11/20/2021    MPV 9.3 11/20/2021     BMP:    Lab Results   Component Value Date     11/20/2021    K 4.0 11/20/2021     11/20/2021    CO2 22 11/20/2021    BUN 14 11/20/2021    CREATININE 0.6 11/20/2021    CALCIUM 9.7 11/20/2021    GFRAA >60 11/20/2021    LABGLOM >60 11/20/2021    LABGLOM 95 02/13/2015    GLUCOSE 133 11/20/2021       Chest Xray:   EKG:        Problem List  Active Problems:    Acute respiratory failure (HCC)  Resolved Problems:    * No resolved hospital problems. *        Assessment/Plan:       1. Acute respiratory failure  Pulse ox dropped to 88 % on RA   Now on 2 L O2 via Nc   CT chest is neg for PE  Covid testing is pending  Cont breathing treatments  Cont IV solumedrol     Thyroid nodule 2.3 cm   Will get TSH levels and thyroid US     Admit as inpatient.  I anticipate hospitalization spanning more than two midnights for investigation and treatment of the above medically necessary diagnoses.       Lu Corona MD    11/20/2021 10:14 PM

## 2021-11-21 NOTE — RT PROTOCOL NOTE
RT Inhaler-Nebulizer Bronchodilator Protocol Note    There is a bronchodilator order in the chart from a provider indicating to follow the RT Bronchodilator Protocol and there is an Initiate RT Inhaler-Nebulizer Bronchodilator Protocol order as well (see protocol at bottom of note). CXR Findings:  No results found. The findings from the last RT Protocol Assessment were as follows:   History Pulmonary Disease: None or smoker <15 pack years  Respiratory Pattern: Regular pattern and RR 12-20 bpm  Breath Sounds: Intermittent or unilateral wheezes  Cough: Strong, spontaneous, non-productive  Indication for Bronchodilator Therapy: Wheezing associated with pulm disorder, Decreased or absent breath sounds  Bronchodilator Assessment Score: 4    Aerosolized bronchodilator medication orders have been revised according to the RT Inhaler-Nebulizer Bronchodilator Protocol below. Respiratory Therapist to perform RT Therapy Protocol Assessment initially then follow the protocol. Repeat RT Therapy Protocol Assessment PRN for score 0-3 or on second treatment, BID, and PRN for scores above 3. No Indications - adjust the frequency to every 6 hours PRN wheezing or bronchospasm, if no treatments needed after 48 hours then discontinue using Per Protocol order mode. If indication present, adjust the RT bronchodilator orders based on the Bronchodilator Assessment Score as indicated below. Use Inhaler orders unless patient has one or more of the following: on home nebulizer, not able to hold breath for 10 seconds, is not alert and oriented, cannot activate and use MDI correctly, or respiratory rate 25 breaths per minute or more, then use the equivalent nebulizer order(s) with same Frequency and PRN reasons based on the score. If a patient is on this medication at home then do not decrease Frequency below that used at home.     0-3 - enter or revise RT bronchodilator order(s) to equivalent RT Bronchodilator order with Frequency of every 4 hours PRN for wheezing or increased work of breathing using Per Protocol order mode. 4-6 - enter or revise RT Bronchodilator order(s) to two equivalent RT bronchodilator orders with one order with BID Frequency and one order with Frequency of every 4 hours PRN wheezing or increased work of breathing using Per Protocol order mode. 7-10 - enter or revise RT Bronchodilator order(s) to two equivalent RT bronchodilator orders with one order with TID Frequency and one order with Frequency of every 4 hours PRN wheezing or increased work of breathing using Per Protocol order mode. 11-13 - enter or revise RT Bronchodilator order(s) to one equivalent RT bronchodilator order with QID Frequency and an Albuterol order with Frequency of every 4 hours PRN wheezing or increased work of breathing using Per Protocol order mode. Greater than 13 - enter or revise RT Bronchodilator order(s) to one equivalent RT bronchodilator order with every 4 hours Frequency and an Albuterol order with Frequency of every 2 hours PRN wheezing or increased work of breathing using Per Protocol order mode. RT to enter RT Home Evaluation for COPD & MDI Assessment order using Per Protocol order mode.     Electronically signed by Josue Meyer RCP on 11/21/2021 at 1:22 AM

## 2021-11-21 NOTE — PROGRESS NOTES
Hospitalist Progress Note      PCP: Keely Null MD    Date of Admission: 11/20/2021    Chief Complaint: SOB    Hospital Course: 79 yo F with history of obesity came to ER with SOB. Admitted as inpatient for acute viral bronchitis with progressing SOB. Noted to have 2.3 cm L thyroid nodule, will get outpatient Thyroid US. TSH 0.57. Subjective: Patient is coughing. Has MCGINNIS for months that is worsening. No CP, HA or abdominal pain. No fevers. Medications:  Reviewed    Infusion Medications    sodium chloride       Scheduled Medications    methylPREDNISolone  40 mg IntraVENous Q6H    fluticasone  1 puff Inhalation BID    sodium chloride flush  5-40 mL IntraVENous 2 times per day    enoxaparin  40 mg SubCUTAneous Daily     PRN Meds: albuterol sulfate HFA, ipratropium, promethazine-codeine, perflutren lipid microspheres, sodium chloride flush, sodium chloride, ondansetron **OR** ondansetron, polyethylene glycol, acetaminophen **OR** acetaminophen      Intake/Output Summary (Last 24 hours) at 11/21/2021 1736  Last data filed at 11/21/2021 1000  Gross per 24 hour   Intake 360 ml   Output --   Net 360 ml       Physical Exam Performed:    BP (!) 156/72   Pulse 102   Temp 98.8 °F (37.1 °C) (Temporal)   Resp 18   Ht 5' 7\" (1.702 m)   Wt 194 lb (88 kg)   SpO2 92%   BMI 30.38 kg/m²     General appearance: No apparent distress, appears stated age and cooperative. HEENT: Pupils equal, round, and reactive to light. Conjunctivae/corneas clear. Neck: Supple, with full range of motion. No jugular venous distention. Trachea midline. Respiratory:  Scattered BL wheezing. Scattered rhonchi. No rales  Cardiovascular: Tachycardia without murmurs, rubs or gallops. Abdomen: Soft, non-tender, non-distended with normal bowel sounds. Musculoskeletal: No clubbing, cyanosis or edema bilaterally. Full range of motion without deformity.   Skin: Skin color, texture, turgor normal.  No rashes or lesions. Neurologic:  Neurovascularly intact without any focal sensory/motor deficits. Cranial nerves: II-XII intact, grossly non-focal.  Psychiatric: Alert and oriented, thought content appropriate, normal insight  Capillary Refill: Brisk,3 seconds, normal   Peripheral Pulses: +2 palpable, equal bilaterally       Labs:   Recent Labs     11/19/21  1554 11/20/21  1352   WBC 6.9 4.8   HGB 14.0 13.5   HCT 42.2 40.5    165     Recent Labs     11/20/21  1352      K 4.0      CO2 22   BUN 14   CREATININE 0.6   CALCIUM 9.7     Recent Labs     11/20/21  1352   *   ALT 90*   BILITOT 0.4   ALKPHOS 107     No results for input(s): INR in the last 72 hours. Recent Labs     11/20/21  1352 11/21/21  1443   TROPONINI <0.01 <0.01       Urinalysis:      Lab Results   Component Value Date    NITRU NEGATIVE 02/13/2015    BLOODU NEGATIVE 02/13/2015    SPECGRAV 1.017 02/13/2015    GLUCOSEU NEGATIVE 02/13/2015       Radiology:  CT CHEST PULMONARY EMBOLISM W CONTRAST   Final Result   1. No evidence of pulmonary embolic disease. 2. No acute pulmonary findings. Remote calcified granulomatous disease. 3. 2.3 cm left thyroid nodule. Nonemergent outpatient ultrasound follow-up   recommended. RECOMMENDATIONS:   2.3 cm incidental left thyroid nodule. Recommend thyroid US. Reference: J Am Sera Radiol. 2015 Feb;12(2): 143-50                 Assessment/Plan:    Active Hospital Problems    Diagnosis     Obesity (BMI 30-39. 9) [E66.9]     Left thyroid nodule [E04.1]     Acute viral bronchitis [J20.8]      1. Increase Solumedrol 40 mg IV q6h  2. Check respiratory viral panel with COVID  3. Droplet plus isolation  4. Serial troponin  5. Check Echo for MCGINNIS  6. Outpatient thyroid US for L thyroid nodule 2.3 cm  7. Phenergan with codeine cough syrup  8. DC Albuterol and Atrovent standing  9. Flovent inhaled for wheezing    DVT Prophylaxis: Lovenox  Diet: ADULT DIET;  Regular  Code Status: Full Code    PT/OT Eval Status: Not needed    Dispo - Home    Discussed with patient and nursing. Hopefully improves with IV Steroids and cough control. Will see what Echo shows. Outpatient thyroid US.     Shelton Crigler, MD

## 2021-11-22 VITALS
TEMPERATURE: 97.7 F | WEIGHT: 194 LBS | BODY MASS INDEX: 30.45 KG/M2 | RESPIRATION RATE: 16 BRPM | DIASTOLIC BLOOD PRESSURE: 91 MMHG | SYSTOLIC BLOOD PRESSURE: 156 MMHG | HEIGHT: 67 IN | HEART RATE: 77 BPM | OXYGEN SATURATION: 93 %

## 2021-11-22 PROBLEM — J09.X2 INFLUENZA DUE TO AVIAN INFLUENZA A VIRUS SUBTYPE H3N2: Status: ACTIVE | Noted: 2021-11-22

## 2021-11-22 LAB
ANION GAP SERPL CALCULATED.3IONS-SCNC: 12 MMOL/L (ref 3–16)
BASOPHILS ABSOLUTE: 0 K/UL (ref 0–0.2)
BASOPHILS RELATIVE PERCENT: 0.1 %
BUN BLDV-MCNC: 24 MG/DL (ref 7–20)
CALCIUM SERPL-MCNC: 8.8 MG/DL (ref 8.3–10.6)
CHLORIDE BLD-SCNC: 101 MMOL/L (ref 99–110)
CO2: 21 MMOL/L (ref 21–32)
CREAT SERPL-MCNC: 0.5 MG/DL (ref 0.6–1.2)
EOSINOPHILS ABSOLUTE: 0 K/UL (ref 0–0.6)
EOSINOPHILS RELATIVE PERCENT: 0 %
GFR AFRICAN AMERICAN: >60
GFR NON-AFRICAN AMERICAN: >60
GLUCOSE BLD-MCNC: 275 MG/DL (ref 70–99)
HCT VFR BLD CALC: 41.6 % (ref 36–48)
HEMOGLOBIN: 13.4 G/DL (ref 12–16)
LV EF: 58 %
LVEF MODALITY: NORMAL
LYMPHOCYTES ABSOLUTE: 0.8 K/UL (ref 1–5.1)
LYMPHOCYTES RELATIVE PERCENT: 6.7 %
MCH RBC QN AUTO: 27.7 PG (ref 26–34)
MCHC RBC AUTO-ENTMCNC: 32.2 G/DL (ref 31–36)
MCV RBC AUTO: 86.1 FL (ref 80–100)
MONOCYTES ABSOLUTE: 0.4 K/UL (ref 0–1.3)
MONOCYTES RELATIVE PERCENT: 3.5 %
NEUTROPHILS ABSOLUTE: 10.8 K/UL (ref 1.7–7.7)
NEUTROPHILS RELATIVE PERCENT: 89.7 %
ORGANISM: ABNORMAL
PDW BLD-RTO: 15 % (ref 12.4–15.4)
PLATELET # BLD: 185 K/UL (ref 135–450)
PMV BLD AUTO: 10.7 FL (ref 5–10.5)
POTASSIUM SERPL-SCNC: 4.6 MMOL/L (ref 3.5–5.1)
RBC # BLD: 4.83 M/UL (ref 4–5.2)
REPORT: NORMAL
RESPIRATORY PANEL PCR: ABNORMAL
SODIUM BLD-SCNC: 134 MMOL/L (ref 136–145)
TROPONIN: <0.01 NG/ML
WBC # BLD: 12.1 K/UL (ref 4–11)

## 2021-11-22 PROCEDURE — 94761 N-INVAS EAR/PLS OXIMETRY MLT: CPT

## 2021-11-22 PROCEDURE — 6370000000 HC RX 637 (ALT 250 FOR IP): Performed by: INTERNAL MEDICINE

## 2021-11-22 PROCEDURE — 97161 PT EVAL LOW COMPLEX 20 MIN: CPT

## 2021-11-22 PROCEDURE — 85025 COMPLETE CBC W/AUTO DIFF WBC: CPT

## 2021-11-22 PROCEDURE — 36415 COLL VENOUS BLD VENIPUNCTURE: CPT

## 2021-11-22 PROCEDURE — 97165 OT EVAL LOW COMPLEX 30 MIN: CPT

## 2021-11-22 PROCEDURE — 84484 ASSAY OF TROPONIN QUANT: CPT

## 2021-11-22 PROCEDURE — 80048 BASIC METABOLIC PNL TOTAL CA: CPT

## 2021-11-22 PROCEDURE — 93306 TTE W/DOPPLER COMPLETE: CPT

## 2021-11-22 PROCEDURE — 94640 AIRWAY INHALATION TREATMENT: CPT

## 2021-11-22 PROCEDURE — 6360000002 HC RX W HCPCS: Performed by: INTERNAL MEDICINE

## 2021-11-22 PROCEDURE — 97535 SELF CARE MNGMENT TRAINING: CPT

## 2021-11-22 PROCEDURE — 97116 GAIT TRAINING THERAPY: CPT

## 2021-11-22 PROCEDURE — 6360000002 HC RX W HCPCS: Performed by: FAMILY MEDICINE

## 2021-11-22 PROCEDURE — 2580000003 HC RX 258: Performed by: FAMILY MEDICINE

## 2021-11-22 RX ORDER — PROMETHAZINE HYDROCHLORIDE AND CODEINE PHOSPHATE 6.25; 1 MG/5ML; MG/5ML
5 SYRUP ORAL EVERY 4 HOURS PRN
Qty: 118 ML | Refills: 0 | Status: SHIPPED | OUTPATIENT
Start: 2021-11-22 | End: 2021-11-25

## 2021-11-22 RX ORDER — FLUTICASONE PROPIONATE 220 UG/1
1 AEROSOL, METERED RESPIRATORY (INHALATION) 2 TIMES DAILY
Qty: 1 EACH | Refills: 0 | Status: SHIPPED | OUTPATIENT
Start: 2021-11-22 | End: 2022-04-28 | Stop reason: SDUPTHER

## 2021-11-22 RX ORDER — OSELTAMIVIR PHOSPHATE 75 MG/1
75 CAPSULE ORAL 2 TIMES DAILY
Qty: 9 CAPSULE | Refills: 0 | Status: SHIPPED | OUTPATIENT
Start: 2021-11-22 | End: 2021-11-27

## 2021-11-22 RX ORDER — PREDNISONE 20 MG/1
40 TABLET ORAL DAILY
Qty: 10 TABLET | Refills: 0 | Status: SHIPPED | OUTPATIENT
Start: 2021-11-22 | End: 2021-11-27

## 2021-11-22 RX ORDER — OSELTAMIVIR PHOSPHATE 75 MG/1
75 CAPSULE ORAL 2 TIMES DAILY
Status: DISCONTINUED | OUTPATIENT
Start: 2021-11-22 | End: 2021-11-22 | Stop reason: HOSPADM

## 2021-11-22 RX ORDER — ALBUTEROL SULFATE 90 UG/1
2 AEROSOL, METERED RESPIRATORY (INHALATION) EVERY 4 HOURS PRN
Qty: 18 G | Refills: 0 | Status: SHIPPED | OUTPATIENT
Start: 2021-11-22 | End: 2022-04-28 | Stop reason: SDUPTHER

## 2021-11-22 RX ADMIN — METHYLPREDNISOLONE SODIUM SUCCINATE 40 MG: 40 INJECTION, POWDER, FOR SOLUTION INTRAMUSCULAR; INTRAVENOUS at 02:43

## 2021-11-22 RX ADMIN — Medication 1 PUFF: at 09:03

## 2021-11-22 RX ADMIN — ENOXAPARIN SODIUM 40 MG: 100 INJECTION SUBCUTANEOUS at 09:50

## 2021-11-22 RX ADMIN — Medication 10 ML: at 09:50

## 2021-11-22 RX ADMIN — OSELTAMIVIR PHOSPHATE 75 MG: 75 CAPSULE ORAL at 11:32

## 2021-11-22 RX ADMIN — METHYLPREDNISOLONE SODIUM SUCCINATE 40 MG: 40 INJECTION, POWDER, FOR SOLUTION INTRAMUSCULAR; INTRAVENOUS at 09:49

## 2021-11-22 RX ADMIN — METHYLPREDNISOLONE SODIUM SUCCINATE 40 MG: 40 INJECTION, POWDER, FOR SOLUTION INTRAMUSCULAR; INTRAVENOUS at 14:46

## 2021-11-22 ASSESSMENT — PAIN SCALES - GENERAL: PAINLEVEL_OUTOF10: 0

## 2021-11-22 ASSESSMENT — PAIN DESCRIPTION - PROGRESSION
CLINICAL_PROGRESSION: NOT CHANGED

## 2021-11-22 NOTE — PROGRESS NOTES
Occupational Therapy   Occupational Therapy Initial Assessment and Discharge Summary   Date: 2021   Patient Name: Malachi Go  MRN: [id number]     : 1955    Date of Service: 2021    Discharge Recommendations: Malachi Go scored a 24/24 on the [facility] ADL Inpatient form. At this time, no further OT is recommended upon discharge due to indep ADLs and mobility. Patient able to keep O2 sats above 90% after ambulation and ADLs in room. Recommend patient returns to prior setting with prior services. OT Equipment Recommendations  Equipment Needed: No    Assessment   Assessment: Patient indep with ADLs and functional mobility  Prognosis: Good  Decision Making: Low Complexity  History: Indep ADLs, works full time  Exam: Indep ADLs, indep mobility  Assistance / Modification: Stable presentation  OT Education: OT Role  Patient Education: Patient verbalized understanding  No Skilled OT: Independent with functional mobility; Independent with ADL's; At baseline function; Safe to return home; No OT goals identified  REQUIRES OT FOLLOW UP: No  Activity Tolerance  Activity Tolerance: Patient Tolerated treatment well  Safety Devices  Safety Devices in place: Yes  Type of devices: All fall risk precautions in place; Call light within reach; Left in chair  Restraints  Initially in place: No           Patient Diagnosis(es): The primary encounter diagnosis was Respiratory distress. Diagnoses of Wheezing and Thyroid nodule were also pertinent to this visit. has a past medical history of Thyroid disorder. has a past surgical history that includes Thyroidectomy () and Ankle fracture surgery (Right, 2004).            Restrictions  Restrictions/Precautions  Restrictions/Precautions: Fall Risk (Moderate fall risk)  Required Braces or Orthoses?: No  Position Activity Restriction  Other position/activity restrictions: Malachi Go is a 78 y.o. female with past medical history of thyroid disorder who presents the ED with complaint of shortness of breath. Patient states she has had a cough she states been ongoing for about a month. Patient states she saw Dr. Shelton Cohen yesterday for chronic cough. Patient states he had chest x-ray done and had blood work ordered. Patient states she had worsening cough and shortness of breath so came to the ED today for further evaluation and treatment. She denies any sick contacts or recent travel but states she does work for the [employer]. Denies any known exposure to COVID-19 or the coronavirus. States she has been vaccinated for Covid with maternal vaccine x2. Has not yet received her booster. She states she has a nonproductive cough that is been ongoing for the past month. She denies any hemoptysis. Denies fever, chills, rashes/lesions, headache, lightheadedness/dizziness, syncope, near syncope, chest pain, orthopnea, pleuritic pain, pedal edema or calf tenderness. She states she does have fairly significant shortness of breath. States she feels like she is wheezing. She denies any history of asthma or COPD. States she is never been a smoker. Denies any history of DVT or PE. Denies recent travel, trips, surgery or immobilization. Subjective   General  Chart Reviewed: Yes  Response to previous treatment: Patient with no complaints from previous session  Family / Caregiver Present: No  Diagnosis: Acute resp failure  Subjective  Subjective: Patient supine in bed, pleasant and cooperative.  On room air, 93% on room air  Vital Signs  Resp: 16  Oxygen Therapy  SpO2: 93 %, room air  Pulse Oximeter Device Mode: Intermittent  Pulse Oximeter Device Location: Finger  O2 Device: None (Room air)        Social/Functional History  Social/Functional History  Lives With: Spouse  Type of Home: House  Home Layout: One level  Home Access: Level entry  Bathroom Shower/Tub: Walk-in shower  Bathroom Toilet: Standard  Bathroom Equipment: Grab bars in shower, Shower chair  Home Equipment: Rolling walker, Cane  ADL Assistance: Independent  Homemaking Responsibilities: Yes (primary for IADLS)  Ambulation Assistance: Independent  Transfer Assistance: Independent  Active : Yes  Type of occupation: Works full time in [employer], works 12 hour shifts, very stressful job per patient  IADL Comments: Shares with   Additional Comments: denies recent falls       Objective        Orientation  Overall Orientation Status: Within Normal Limits  Observation/Palpation  Posture: Good  Balance  Sitting Balance: Independent  Standing Balance: Independent  Standing Balance  Time: @ 5 minutes  Activity: 100 feet within room, on room air, sats 93% after ambulation  Toilet Transfers  Toilet Transfer: Independent  Toilet Transfers Comments: Reports indep in room and indep in bathroom per patient  ADL  Feeding: Independent  Grooming: Independent  LE Dressing: Independent  Additional Comments: Patient indep in room, reports took a shower yesterday without issues or SOB. Indep all mobility  Tone RUE  RUE Tone: Normotonic  Tone LUE  LUE Tone: Normotonic  Coordination  Movements Are Fluid And Coordinated: Yes           Vision - Basic Assessment  Visual History: No significant visual history  Patient Visual Report: No visual complaint reported. Cognition  Overall Cognitive Status: WNL  Perception  Overall Perceptual Status: WFL     Sensation  Overall Sensation Status: WFL        LUE AROM (degrees)  LUE AROM : WNL  Left Hand AROM (degrees)  Left Hand AROM: WNL  RUE AROM (degrees)  RUE AROM : WNL  Right Hand AROM (degrees)  Right Hand AROM: WNL  LUE Strength  Gross LUE Strength: WNL  RUE Strength  Gross RUE Strength:  WNL                   Plan   Plan  Times per week: no acute care goals indicated    G-Code     OutComes Score                                                  AM-PAC Score        AM-PAC Inpatient Daily Activity Raw Score: 24 (11/22/21 0909)  AM-PAC Inpatient ADL T-Scale Score :

## 2021-11-22 NOTE — PROGRESS NOTES
Patient arrived on 5T. Oriented to room and call light. VSS. A&O. Independent in room. Respirations easy and even. Resting comfortably in bed. Call light in reach.

## 2021-11-22 NOTE — PLAN OF CARE
Problem: Pain:  Goal: Pain level will decrease  Description: Pain level will decrease  11/22/2021 1240 by Sharon Nguyen RN  Outcome: Completed  11/22/2021 0142 by Rafi Worthy RN  Outcome: Ongoing  Goal: Control of acute pain  Description: Control of acute pain  11/22/2021 1240 by Sharon Nguyen RN  Outcome: Completed  11/22/2021 0142 by Rafi Worthy RN  Outcome: Ongoing  Goal: Control of chronic pain  Description: Control of chronic pain  11/22/2021 1240 by Sharon Nguyen RN  Outcome: Completed  11/22/2021 0142 by Rfai Worthy RN  Outcome: Ongoing     Problem: Falls - Risk of:  Goal: Will remain free from falls  Description: Will remain free from falls  11/22/2021 1240 by Sharon Nguyen RN  Outcome: Completed  11/22/2021 0142 by Rafi Worthy RN  Outcome: Ongoing  Goal: Absence of physical injury  Description: Absence of physical injury  11/22/2021 1240 by Sharon Nguyen RN  Outcome: Completed  11/22/2021 0142 by Rafi Worthy RN  Outcome: Ongoing     Problem: Breathing Pattern - Ineffective:  Goal: Ability to achieve and maintain a regular respiratory rate will improve  Description: Ability to achieve and maintain a regular respiratory rate will improve  11/22/2021 1240 by Sharon Nguyen RN  Outcome: Completed  11/22/2021 0142 by Rafi Worthy RN  Outcome: Ongoing

## 2021-11-22 NOTE — PROGRESS NOTES
Echo results verified and given to Zoraida Horne. Ok to d/c.  Removing IV and pt's ride will be here around 4:30pm.

## 2021-11-22 NOTE — DISCHARGE SUMMARY
Hospital Medicine Discharge Summary    Patient: Ghassan Morton     Gender: female  : 1955   Age: 78 y.o. MRN: [id number]    Admitting Physician: Krystal Pfeiffer MD  Discharge Physician: Eb Ashley MD     Code Status: Full Code     Admit Date: 2021   Discharge Date:   21    Disposition:  Home    Discharge Diagnoses: Active Hospital Problems    Diagnosis Date Noted    Influenza due to cayla influenza A virus subtype H3N2 [J09.X2] 2021    Obesity (BMI 30-39. 9) [E66.9] 2021    Left thyroid nodule [E04.1] 2021    Acute viral bronchitis [J20.8] 2021       Follow-up appointments:  one week    Outpatient to do list: F/U with PCP. PCP should arrange outpatient Thyroid US for 2.3 L thyroid nodule. Consider SPECT as outpatient. PCP to follow up Echo. PFTs with Pulmonary. Condition at Discharge:  Stable    Hospital Course:   77 yo F with history of obesity came to ER with SOB. Admitted as inpatient for acute viral bronchitis with progressing SOB. Noted to have 2.3 cm L thyroid nodule, will get outpatient Thyroid US. TSH 0.57. Respiratory viral panel with Influenza A H3. Will finish 5 days of Tamiflu at home. Started on Flovent and will finish short course of Prednisone for acute bronchitis. Echo is pending at time of discharge summary. Consider SPECT as OP.   Patient to follow up with PCP and Pulmonary.         Discharge Medications:   Current Discharge Medication List      START taking these medications    Details   albuterol sulfate  (90 Base) MCG/ACT inhaler Inhale 2 puffs into the lungs every 4 hours as needed for Wheezing  Qty: 18 g, Refills: 0      fluticasone (FLOVENT HFA) 220 MCG/ACT inhaler Inhale 1 puff into the lungs 2 times daily  Qty: 1 each, Refills: 0      oseltamivir (TAMIFLU) 75 MG capsule Take 1 capsule by mouth 2 times daily for 9 doses  Qty: 9 capsule, Refills: 0      promethazine-codeine (PHENERGAN WITH CODEINE) 6.25-10 MG/5ML syrup Take 5 mLs by mouth every 4 hours as needed for Cough for up to 3 days. Qty: 118 mL, Refills: 0    Comments: Reduce doses taken as pain becomes manageable  Associated Diagnoses: Influenza due to cayla influenza A virus subtype H3N2      predniSONE (DELTASONE) 20 MG tablet Take 2 tablets by mouth daily for 5 days  Qty: 10 tablet, Refills: 0           Current Discharge Medication List        Current Discharge Medication List      CONTINUE these medications which have NOT CHANGED    Details   naproxen sodium (ALEVE) 220 MG tablet Take 220 mg by mouth 2 times daily (with meals)           Current Discharge Medication List              Discharge Exam:    BP (!) 156/91   Pulse 77   Temp 97.7 °F (36.5 °C) (Oral)   Resp 16   Ht 5' 7\" (1.702 m)   Wt 194 lb (88 kg)   SpO2 93%   BMI 30.38 kg/m²   General appearance: No apparent distress, appears stated age and cooperative. HEENT: Pupils equal, round, and reactive to light. Conjunctivae/corneas clear. Neck: Supple, with full range of motion. No jugular venous distention. Trachea midline. Respiratory:  Improved BL wheezing. Scattered rhonchi. No rales  Cardiovascular: Tachycardia without murmurs, rubs or gallops. Abdomen: Soft, non-tender, non-distended with normal bowel sounds. Musculoskeletal: No clubbing, cyanosis or edema bilaterally. Full range of motion without deformity. Skin: Skin color, texture, turgor normal.  No rashes or lesions. Neurologic:  Neurovascularly intact without any focal sensory/motor deficits. Cranial nerves: II-XII intact, grossly non-focal.  Psychiatric: Alert and oriented, thought content appropriate, normal insight  Capillary Refill: Brisk,3 seconds, normal   Peripheral Pulses: +2 palpable, equal bilaterally     Labs:  For convenience and continuity at follow-up the following most recent labs are provided:    Lab Results   Component Value Date    WBC 12.1 11/22/2021    HGB 13.4 11/22/2021    HCT 41.6 11/22/2021    MCV 86.1 11/22/2021     11/22/2021     11/22/2021    K 4.6 11/22/2021    K 4.0 11/20/2021     11/22/2021    CO2 21 11/22/2021    BUN 24 11/22/2021    CREATININE 0.5 11/22/2021    CALCIUM 8.8 11/22/2021    ALKPHOS 107 11/20/2021    ALT 90 11/20/2021     11/20/2021    BILITOT 0.4 11/20/2021    LABALBU 4.0 11/20/2021    LDLCALC 88 05/09/2016    TRIG 141 05/09/2016     No results found for: INR    Radiology:  XR CHEST (2 VW)    Result Date: 11/19/2021  EXAMINATION: TWO XRAY VIEWS OF THE CHEST 11/19/2021 4:04 pm COMPARISON: None. HISTORY: ORDERING SYSTEM PROVIDED HISTORY: Cough TECHNOLOGIST PROVIDED HISTORY: Reason for exam:->Cough Reason for Exam: Cough Acuity: Acute Type of Exam: Initial FINDINGS: There is no acute consolidation or effusion. There is no pneumothorax. The mediastinal structures are unremarkable. The upper abdomen is unremarkable. The extrathoracic soft tissues are unremarkable. There is no acute osseous abnormality. No acute cardiopulmonary process. CT CHEST PULMONARY EMBOLISM W CONTRAST    Result Date: 11/21/2021  EXAMINATION: CTA OF THE CHEST 11/20/2021 2:43 pm TECHNIQUE: CTA of the chest was performed after the administration of intravenous contrast.  Multiplanar reformatted images are provided for review. MIP images are provided for review. Dose modulation, iterative reconstruction, and/or weight based adjustment of the mA/kV was utilized to reduce the radiation dose to as low as reasonably achievable. COMPARISON: None. HISTORY: ORDERING SYSTEM PROVIDED HISTORY: tachy - hypoxia TECHNOLOGIST PROVIDED HISTORY: Reason for exam:->tachy - hypoxia Decision Support Exception - unselect if not a suspected or confirmed emergency medical condition->Emergency Medical Condition (MA) Reason for Exam: Shortness of Breath (Pt reports cough, fever, SOB since yesterday morning. FINDINGS: Pulmonary Arteries: Pulmonary arteries are adequately opacified for evaluation.   No evidence of intraluminal filling defect to suggest pulmonary embolism. Main pulmonary artery is normal in caliber. Mediastinum: The thoracic aorta is normal in course and caliber. The heart is not enlarged with no pericardial effusion. Prominent calcified nodes in the mediastinum and right hilum consistent with remote granulomatous disease. No pathologic adenopathy. 2.3 cm left thyroid nodule. The esophagus is unremarkable. Lungs/pleura: The lungs are without acute process. No focal consolidation or pulmonary edema. No evidence of pleural effusion or pneumothorax. Calcified granuloma in the right upper lobe anteriorly. Upper Abdomen: There is suggestion of hepatic steatosis. The visualized upper abdominal viscera are otherwise unremarkable. Soft Tissues/Bones: No acute bone or soft tissue abnormality. 1. No evidence of pulmonary embolic disease. 2. No acute pulmonary findings. Remote calcified granulomatous disease. 3. 2.3 cm left thyroid nodule. Nonemergent outpatient ultrasound follow-up recommended. RECOMMENDATIONS: 2.3 cm incidental left thyroid nodule. Recommend thyroid US. Reference: J Am Sera Radiol. 2015 Feb;12(2): 143-50     The patient was seen and examined on day of discharge and this discharge summary is in conjunction with any daily progress note from day of discharge. Time Spent on discharge is 45 minutes  in the examination, evaluation, counseling and review of medications and discharge plan. Note that more than 30 minutes was spent in preparing discharge papers, discussing discharge with patient, medication review, etc.       Signed:    Matias Mendiola MD   11/22/2021      Thank you Kimberli Redmond MD for the opportunity to be involved in this patient's care.  If you have any questions or concerns please feel free to contact me at [address] [place] [address]

## 2021-11-22 NOTE — PROGRESS NOTES
CLINICAL PHARMACY NOTE: MEDS TO BEDS    Total # of Prescriptions Filled: 5   The following medications were delivered to the patient:  · Promethazine with codeine  · Albuterol hfa  · oseltamivir 75  · Prednisone 20  · flovent 220    Additional Documentation:  Patient signed for 5 scripts at bedside

## 2021-11-22 NOTE — PLAN OF CARE
Problem: Pain:  Goal: Pain level will decrease  Description: Pain level will decrease  Outcome: Ongoing  Goal: Control of acute pain  Description: Control of acute pain  Outcome: Ongoing  Goal: Control of chronic pain  Description: Control of chronic pain  Outcome: Ongoing     Problem: Falls - Risk of:  Goal: Will remain free from falls  Description: Will remain free from falls  Outcome: Ongoing  Goal: Absence of physical injury  Description: Absence of physical injury  Outcome: Ongoing     Problem: Breathing Pattern - Ineffective:  Goal: Ability to achieve and maintain a regular respiratory rate will improve  Description: Ability to achieve and maintain a regular respiratory rate will improve  Outcome: Ongoing

## 2021-11-22 NOTE — PROGRESS NOTES
VSS. A&&O. Morning medications given. No other needs expressed by pt at this time. Call light within reach. Will continue to monitor. Went over discharge paperwork, signed and put in chart. Waiting on echo and results.

## 2021-11-22 NOTE — PROGRESS NOTES
Physical Therapy    Facility/Department: [address] ONCOLOGY  Initial Assessment/Discharge Summary    NAME: Charlene Brand  : 1955  MRN: [id number]    Date of Service: 2021    Discharge Recommendations:    Charlene Brand scored a 24/24 on the AM-PAC short mobility form. At this time, no further PT is recommended upon discharge due to independence with mobility. Recommend patient returns to prior setting with prior services. PT Equipment Recommendations  Equipment Needed: No    Assessment   Assessment: Patient presents with mild endurance deficits with independent functional mobility. Feel patient will return to baseline endurance without PT intervention. Prognosis: Good  Decision Making: Low Complexity  Clinical Presentation: stable  PT Education: PT Role  Patient Education: encouraged to perform mobility - verbalized understanding  Barriers to Learning: none  REQUIRES PT FOLLOW UP: No  Activity Tolerance  Activity Tolerance: Patient Tolerated treatment well       Patient Diagnosis(es): The primary encounter diagnosis was Respiratory distress. Diagnoses of Wheezing and Thyroid nodule were also pertinent to this visit. has a past medical history of Thyroid disorder. has a past surgical history that includes Thyroidectomy () and Ankle fracture surgery (Right, 2004). Restrictions  Restrictions/Precautions  Restrictions/Precautions: Fall Risk (Moderate fall risk)  Required Braces or Orthoses?: No  Position Activity Restriction  Other position/activity restrictions: Charlene Brand is a 78 y.o. female with past medical history of thyroid disorder who presents the ED with complaint of shortness of breath. Patient states she has had a cough she states been ongoing for about a month. Patient states she saw Dr. Princess Youngblood yesterday for chronic cough. Patient states he had chest x-ray done and had blood work ordered.   Patient states she had worsening cough and shortness of breath so came to the ED today for further evaluation and treatment. She denies any sick contacts or recent travel but states she does work for the [employer]. Denies any known exposure to COVID-19 or the coronavirus. States she has been vaccinated for Covid with maternal vaccine x2. Has not yet received her booster. She states she has a nonproductive cough that is been ongoing for the past month. She denies any hemoptysis. Denies fever, chills, rashes/lesions, headache, lightheadedness/dizziness, syncope, near syncope, chest pain, orthopnea, pleuritic pain, pedal edema or calf tenderness. She states she does have fairly significant shortness of breath. States she feels like she is wheezing. She denies any history of asthma or COPD. States she is never been a smoker. Denies any history of DVT or PE. Denies recent travel, trips, surgery or immobilization. Vision/Hearing  Vision: Within Functional Limits (Simultaneous filing. User may not have seen previous data.)  Hearing: Within functional limits (Simultaneous filing. User may not have seen previous data.)     Subjective  General  Chart Reviewed: Yes  Family / Caregiver Present: No  Diagnosis: acute viral bronchitis  Follows Commands: Within Functional Limits  General Comment  Comments: supine in bed upon arrival  Subjective  Subjective: Denied pain. Agreeable to therapy. States she feels better, but still feels SOB with mobility.   Pain Screening  Patient Currently in Pain: Denies          Orientation  Orientation  Overall Orientation Status: Within Functional Limits  Social/Functional History  Social/Functional History  Lives With: Spouse  Type of Home: House  Home Layout: One level  Home Access: Level entry  Bathroom Shower/Tub: Walk-in shower  Bathroom Toilet: Standard  Bathroom Equipment: Grab bars in shower, Shower chair  Home Equipment: Rolling walker, Cane  ADL Assistance: Independent  Homemaking Responsibilities: Yes (primary for IADLS)  Ambulation Assistance: Independent  Transfer Assistance: Independent  Active : Yes  Type of occupation: Works full time in [employer], works 12 hour shifts, very stressful job per patient  IADL Comments: Shares with   Additional Comments: denies recent falls    Objective    AROM RLE (degrees)  RLE AROM: WFL  AROM LLE (degrees)  LLE AROM : WFL  Strength RLE  Strength RLE: WFL  Strength LLE  Strength LLE: WFL     Sensation  Overall Sensation Status: WFL  Bed mobility  Bridging: Independent  Supine to Sit: Independent  Scooting: Independent  Transfers  Sit to Stand: Independent  Stand to sit:  Independent  Ambulation  Ambulation?: Yes  Ambulation 1  Surface: level tile  Device: No Device  Assistance: Independent  Quality of Gait: steady gait, normal sander  Distance: 100'  Comments: SpO2 93% on RA, HR 81     Balance  Sitting - Static: Good  Sitting - Dynamic: Good  Standing - Static: Good  Standing - Dynamic: Good        Plan   Plan  Times per week: D/C acute PT  Safety Devices  Type of devices: Left in chair, Nurse notified, Call light within reach, All fall risk precautions in place  Restraints  Initially in place: No           AM-PAC Score  AM-PAC Inpatient Mobility Raw Score : 24 (11/22/21 0913)  AM-PAC Inpatient T-Scale Score : 61.14 (11/22/21 0913)  Mobility Inpatient CMS 0-100% Score: 0 (11/22/21 0913)  Mobility Inpatient CMS G-Code Modifier : [address] (11/22/21 0913)          Goals  Short term goals  Time Frame for Short term goals: No acute PT goals       Therapy Time   Individual Concurrent Group Co-treatment   Time In 0840         Time Out 0903         Minutes 23         Timed Code Treatment Minutes: 8 Minutes       Candi Pickard, PT    Thanks, Candi Pickard, PT, DPT [id number]

## 2021-11-24 LAB
BLOOD CULTURE, ROUTINE: NORMAL
CULTURE, BLOOD 2: NORMAL

## 2021-11-25 LAB
2000687N OAK TREE IGE: 2.73 KU/L (ref 0–0.34)
ALLERGEN BERMUDA GRASS IGE: 1.47 KU/L (ref 0–0.34)
ALLERGEN BIRCH IGE: 1.02 KU/L (ref 0–0.34)
ALLERGEN DOG DANDER IGE: 0.49 KU/L (ref 0–0.34)
ALLERGEN GERMAN COCKROACH IGE: 0.82 KU/L (ref 0–0.34)
ALLERGEN HORMODENDRUM IGE: <0.1 KUL/L (ref 0–0.34)
ALLERGEN MOUSE EPITHELIA IGE: <0.1 KU/L (ref 0–0.34)
ALLERGEN PECAN TREE IGE: 4.3 KU/L (ref 0–0.34)
ALLERGEN PIGWEED ROUGH IGE: 2.2 KU/L (ref 0–0.34)
ALLERGEN SHEEP SORREL (W18) IGE: 1.8 KU/L (ref 0–0.34)
ALLERGEN TREE SYCAMORE: 1.84 KU/L (ref 0–0.34)
ALLERGEN WALNUT TREE IGE: 2.86 KU/L (ref 0–0.34)
ALLERGEN WHITE MULBERRY TREE, IGE: 1.95 KU/L (ref 0–0.34)
ALLERGEN, TREE, WHITE ASH IGE: 5.36 KU/L (ref 0–0.34)
ALTERNARIA ALTERNATA: 1.37 KU/L (ref 0–0.34)
ASPERGILLUS FUMIGATUS: <0.1 KU/L (ref 0–0.34)
CAT DANDER ANTIBODY: 1.35 KU/L (ref 0–0.34)
COTTONWOOD TREE: 2.37 KU/L (ref 0–0.34)
D. FARINAE: 0.68 KU/L (ref 0–0.34)
D. PTERONYSSINUS: 1.95 KU/L (ref 0–0.34)
ELM TREE: 1.49 KU/L (ref 0–0.34)
IGE: 269 IU/ML
MAPLE/BOXELDER TREE: 2.08 KU/L (ref 0–0.34)
MOUNTAIN CEDAR TREE: 1.21 KU/L (ref 0–0.34)
MUCOR RACEMOSUS: <0.1 KU/L (ref 0–0.34)
P. NOTATUM: <0.1 KU/L (ref 0–0.34)
RUSSIAN THISTLE: 2.11 KU/L (ref 0–0.34)
SHORT RAGWD(A ARTEMIS.) IGE: 4.86 KU/L (ref 0–0.34)
TIMOTHY GRASS: 2.55 KU/L (ref 0–0.34)

## 2021-11-26 ENCOUNTER — HOSPITAL ENCOUNTER (OUTPATIENT)
Age: 66
Discharge: HOME | End: 2021-11-26
Payer: COMMERCIAL

## 2021-11-26 DIAGNOSIS — R05.9 COUGH: ICD-10-CM

## 2021-11-26 PROCEDURE — U0003 INFECTIOUS AGENT DETECTION BY NUCLEIC ACID (DNA OR RNA); SEVERE ACUTE RESPIRATORY SYNDROME CORONAVIRUS 2 (SARS-COV-2) (CORONAVIRUS DISEASE [COVID-19]), AMPLIFIED PROBE TECHNIQUE, MAKING USE OF HIGH THROUGHPUT TECHNOLOGIES AS DESCRIBED BY CMS-2020-01-R: HCPCS

## 2021-11-26 PROCEDURE — U0005 INFEC AGEN DETEC AMPLI PROBE: HCPCS

## 2021-11-27 LAB — SARS-COV-2: NOT DETECTED

## 2021-12-01 ENCOUNTER — HOSPITAL ENCOUNTER (OUTPATIENT)
Dept: PULMONOLOGY | Age: 66
Discharge: HOME | End: 2021-12-01
Payer: COMMERCIAL

## 2021-12-01 VITALS — RESPIRATION RATE: 18 BRPM | OXYGEN SATURATION: 95 % | HEART RATE: 87 BPM

## 2021-12-01 PROCEDURE — 94070 EVALUATION OF WHEEZING: CPT

## 2021-12-01 PROCEDURE — 94729 DIFFUSING CAPACITY: CPT

## 2021-12-01 PROCEDURE — 94760 N-INVAS EAR/PLS OXIMETRY 1: CPT

## 2021-12-01 PROCEDURE — 94726 PLETHYSMOGRAPHY LUNG VOLUMES: CPT

## 2021-12-01 PROCEDURE — 6370000000 HC RX 637 (ALT 250 FOR IP): Performed by: INTERNAL MEDICINE

## 2021-12-01 PROCEDURE — 6360000002 HC RX W HCPCS: Performed by: INTERNAL MEDICINE

## 2021-12-01 PROCEDURE — 94200 LUNG FUNCTION TEST (MBC/MVV): CPT

## 2021-12-01 RX ORDER — ALBUTEROL SULFATE 2.5 MG/3ML
2.5 SOLUTION RESPIRATORY (INHALATION) ONCE
Status: COMPLETED | OUTPATIENT
Start: 2021-12-01 | End: 2021-12-01

## 2021-12-01 RX ORDER — SODIUM CHLORIDE FOR INHALATION 0.9 %
3 VIAL, NEBULIZER (ML) INHALATION ONCE
Status: COMPLETED | OUTPATIENT
Start: 2021-12-01 | End: 2021-12-01

## 2021-12-01 RX ADMIN — METHACHOLINE CHLORIDE 1 MG: 100 POWDER, FOR SOLUTION RESPIRATORY (INHALATION) at 09:09

## 2021-12-01 RX ADMIN — METHACHOLINE CHLORIDE 0.25 MG: 100 POWDER, FOR SOLUTION RESPIRATORY (INHALATION) at 09:03

## 2021-12-01 RX ADMIN — ISODIUM CHLORIDE 3 ML: 0.03 SOLUTION RESPIRATORY (INHALATION) at 08:41

## 2021-12-01 RX ADMIN — METHACHOLINE CHLORIDE 4 MG: 100 POWDER, FOR SOLUTION RESPIRATORY (INHALATION) at 09:15

## 2021-12-01 RX ADMIN — METHACHOLINE CHLORIDE 0.06 MG: 100 POWDER, FOR SOLUTION RESPIRATORY (INHALATION) at 08:59

## 2021-12-01 RX ADMIN — ALBUTEROL SULFATE 2.5 MG: 2.5 SOLUTION RESPIRATORY (INHALATION) at 09:21

## 2021-12-01 NOTE — PROGRESS NOTES
Methacholine challenge complete. Four doses were given and pt FEV1 did drop below - 20% after the fourth dose. Challenge stopped and Albuterol given, Pt left near baseline.

## 2021-12-02 NOTE — PROCEDURES
Pulmonary Function Testing      Patient name:  Kwadwo Lopez     [facility] Unit #:   [id number]   Date of test:  12/1/2021  Date of interpretation:   12/2/2021    Ms. Kwadwo Lopez is a 78y.o. year-old non smoker. The spirometry data were acceptable and reproducible. Spirometry:  Flow volume loops were normal. The FEV-1/FVC ratio was normal. The FEV-1 was 1.91 liters (72% of predicted), which was moderately decreased. The FVC was 2.57 liters (74% of predicted), which was decreased. Response to inhaled bronchodilators (albuterol) was not significant. Lung volumes:  Lung volumes were tested by plethysmography. The total lung capacity was 4.91 liters (90% of predicted), which was normal. The residual volume was 2.35 liters (109% of predicted), which was normal. The ratio of residual volume to total lung capacity (RV/TLC) was 48, which was increased. Diffusion capacity was found to be 67% which is Mildly decreased. Methacholine Challenge test: Significant decrease in both FEV1 and FVC noted at level 4 (4mg dose) of methacholine challenge. Interpretation: Normal PFT study with positive methacholine challenge test. Consider asthma or reactive airway disease. Clinical correlation is recommended.       Comments:

## 2021-12-09 ENCOUNTER — OFFICE VISIT (OUTPATIENT)
Dept: PULMONOLOGY | Age: 66
End: 2021-12-09
Payer: COMMERCIAL

## 2021-12-09 VITALS — HEART RATE: 77 BPM | OXYGEN SATURATION: 96 %

## 2021-12-09 DIAGNOSIS — J45.20 MILD INTERMITTENT ASTHMA WITHOUT COMPLICATION: Primary | ICD-10-CM

## 2021-12-09 DIAGNOSIS — Z87.09 HISTORY OF INFLUENZA: ICD-10-CM

## 2021-12-09 DIAGNOSIS — Z09 HOSPITAL DISCHARGE FOLLOW-UP: ICD-10-CM

## 2021-12-09 PROCEDURE — G8417 CALC BMI ABV UP PARAM F/U: HCPCS | Performed by: NURSE PRACTITIONER

## 2021-12-09 PROCEDURE — 1111F DSCHRG MED/CURRENT MED MERGE: CPT | Performed by: NURSE PRACTITIONER

## 2021-12-09 PROCEDURE — 3017F COLORECTAL CA SCREEN DOC REV: CPT | Performed by: NURSE PRACTITIONER

## 2021-12-09 PROCEDURE — 1123F ACP DISCUSS/DSCN MKR DOCD: CPT | Performed by: NURSE PRACTITIONER

## 2021-12-09 PROCEDURE — G8400 PT W/DXA NO RESULTS DOC: HCPCS | Performed by: NURSE PRACTITIONER

## 2021-12-09 PROCEDURE — G8427 DOCREV CUR MEDS BY ELIG CLIN: HCPCS | Performed by: NURSE PRACTITIONER

## 2021-12-09 PROCEDURE — 1090F PRES/ABSN URINE INCON ASSESS: CPT | Performed by: NURSE PRACTITIONER

## 2021-12-09 PROCEDURE — 1036F TOBACCO NON-USER: CPT | Performed by: NURSE PRACTITIONER

## 2021-12-09 PROCEDURE — G8484 FLU IMMUNIZE NO ADMIN: HCPCS | Performed by: NURSE PRACTITIONER

## 2021-12-09 PROCEDURE — 4040F PNEUMOC VAC/ADMIN/RCVD: CPT | Performed by: NURSE PRACTITIONER

## 2021-12-09 PROCEDURE — 99214 OFFICE O/P EST MOD 30 MIN: CPT | Performed by: NURSE PRACTITIONER

## 2021-12-09 RX ORDER — MONTELUKAST SODIUM 10 MG/1
10 TABLET ORAL NIGHTLY
Qty: 30 TABLET | Refills: 3 | Status: SHIPPED | OUTPATIENT
Start: 2021-12-09 | End: 2022-04-28 | Stop reason: SDUPTHER

## 2021-12-09 ASSESSMENT — ENCOUNTER SYMPTOMS
COUGH: 0
CONSTIPATION: 0
SHORTNESS OF BREATH: 0
CHEST TIGHTNESS: 1
COLOR CHANGE: 0
ABDOMINAL PAIN: 0

## 2021-12-09 NOTE — PROGRESS NOTES
[facility] Follow Up Note    Subjective:   CHIEF COMPLAINT / HPI: Recent hospitalization with flu A   The patient is 78 y.o. female who presents today for a routine follow up visit related to the above mentioned issues. There is a PMH significant for other conditions including thyroid issues. She was last evaluated by Dr. Yonny norman last month. At that time she was coughing more following and exposure. Unfortunately she then developed worsening symptoms and required hospitalization. There she was found to be flu A positive. She was treated with Tamiflu and systemic steroids. Presently she reports she is feeling much better. She denies cough / fevers. She does have periodic chest tightness which is improved with inhaled medication. She reports compliance with BID Flovent and PRN Albuterol. She has completed Tamiflu and Prednisone. She does not require supplemental O2. Past Medical History:   Diagnosis Date    Thyroid disorder      Social History:    Social History     Tobacco Use   Smoking Status Never Smoker   Smokeless Tobacco Never Used     Current Medications:     Current Outpatient Medications on File Prior to Visit   Medication Sig Dispense Refill    albuterol sulfate  (90 Base) MCG/ACT inhaler Inhale 2 puffs into the lungs every 4 hours as needed for Wheezing 18 g 0    fluticasone (FLOVENT HFA) 220 MCG/ACT inhaler Inhale 1 puff into the lungs 2 times daily 1 each 0    naproxen sodium (ALEVE) 220 MG tablet Take 220 mg by mouth 2 times daily (with meals)       No current facility-administered medications on file prior to visit. Review of Systems   Constitutional: Negative for chills and fever. HENT: Negative for congestion and postnasal drip. Respiratory: Positive for chest tightness. Negative for cough and shortness of breath. Cardiovascular: Negative for chest pain and leg swelling. Gastrointestinal: Negative for abdominal pain and constipation. Musculoskeletal: Negative for arthralgias and joint swelling. Skin: Negative for color change and pallor. Allergic/Immunologic: Negative for environmental allergies and food allergies. Psychiatric/Behavioral: Negative for agitation and confusion. Objective:       VITALS:  Pulse 77   SpO2 96% Comment: RA at rest     Physical Exam  Vitals reviewed. Constitutional:       General: She is not in acute distress. Appearance: She is well-developed. HENT:      Head: Normocephalic. Mouth/Throat:      Pharynx: No oropharyngeal exudate. Eyes:      General:         Right eye: No discharge. Left eye: No discharge. Conjunctiva/sclera: Conjunctivae normal.      Pupils: Pupils are equal, round, and reactive to light. Neck:      Trachea: No tracheal deviation. Cardiovascular:      Rate and Rhythm: Normal rate and regular rhythm. Heart sounds: No friction rub. Pulmonary:      Effort: Pulmonary effort is normal. No tachypnea, accessory muscle usage or respiratory distress. Breath sounds: No stridor. No wheezing, rhonchi or rales. Chest:      Chest wall: No tenderness or crepitus. Abdominal:      General: Bowel sounds are normal. There is no distension. Palpations: Abdomen is soft. Tenderness: There is no abdominal tenderness. Musculoskeletal:         General: Normal range of motion. Cervical back: Normal range of motion and neck supple. Lymphadenopathy:      Cervical: No cervical adenopathy. Skin:     General: Skin is warm and dry. Findings: No erythema. Neurological:      Mental Status: She is alert and oriented to person, place, and time. Psychiatric:         Thought Content: Thought content normal.       DATA:      Radiology Review:  Pertinent images / reports were reviewed as a part of this visit. CT chest done 2021 reveals the followin. No evidence of pulmonary embolic disease. 2. No acute pulmonary findings.   Remote calcified granulomatous disease. 3. 2.3 cm left thyroid nodule. Nonemergent outpatient ultrasound follow-up recommended. Last PFTs done Dec 2021:  Flow volume loops were normal. The FEV-1/FVC ratio was normal. The FEV-1 was 1.91 liters (72% of predicted), which was moderately decreased. The FVC was 2.57 liters (74% of predicted), which was decreased. Response to inhaled bronchodilators (albuterol) was not significant. Lung volumes:  Lung volumes were tested by plethysmography. The total lung capacity was 4.91 liters (90% of predicted), which was normal. The residual volume was 2.35 liters (109% of predicted), which was normal. The ratio of residual volume to total lung capacity (RV/TLC) was 48, which was increased. Diffusion capacity was found to be 67% which is Mildly decreased. Methacholine Challenge test: Significant decrease in both FEV1 and FVC noted at level 4 (4mg dose) of methacholine challenge. Interpretation: Normal PFT study with positive methacholine challenge test. Consider asthma or reactive airway disease. Clinical correlation is recommended. Assessment / Plan:   1. Mild intermittent asthma without complication / environmental allergies  - + Methacholine challenge c/w asthma  - Respiratory allergen profile with significant reactions to trees/nuts, avoid  - Add Singulair and continue Flovent with PRN MALAIKA  - Chest imaging from the hospital was clear though an incidental Thyroid nodule was identified   - Will determine how she does over the next few months  - If doing well, could consider deescalating therapy over the summer    2. History of influenza  - She has completed Tamiflu  - Recommend flu shot next year    3. Hospital discharge follow-up  - Overall she is much improved since her hospitalization  - We did discuss the thyroid nodule  - Recommend f/u with PCP + Thyroid US, she verbalizes understanding   - Note to Dr. Jabari Rubin    Return in about 4 months (around 4/9/2022).

## 2022-04-28 ENCOUNTER — OFFICE VISIT (OUTPATIENT)
Dept: PULMONOLOGY | Age: 67
End: 2022-04-28
Payer: MEDICARE

## 2022-04-28 VITALS
HEART RATE: 68 BPM | DIASTOLIC BLOOD PRESSURE: 78 MMHG | OXYGEN SATURATION: 98 % | WEIGHT: 195 LBS | BODY MASS INDEX: 30.61 KG/M2 | HEIGHT: 67 IN | SYSTOLIC BLOOD PRESSURE: 126 MMHG

## 2022-04-28 DIAGNOSIS — J30.2 SEASONAL ALLERGIES: ICD-10-CM

## 2022-04-28 DIAGNOSIS — J45.20 MILD INTERMITTENT ASTHMA WITHOUT COMPLICATION: Primary | ICD-10-CM

## 2022-04-28 PROCEDURE — G8417 CALC BMI ABV UP PARAM F/U: HCPCS | Performed by: NURSE PRACTITIONER

## 2022-04-28 PROCEDURE — 4040F PNEUMOC VAC/ADMIN/RCVD: CPT | Performed by: NURSE PRACTITIONER

## 2022-04-28 PROCEDURE — 3017F COLORECTAL CA SCREEN DOC REV: CPT | Performed by: NURSE PRACTITIONER

## 2022-04-28 PROCEDURE — G8400 PT W/DXA NO RESULTS DOC: HCPCS | Performed by: NURSE PRACTITIONER

## 2022-04-28 PROCEDURE — G8428 CUR MEDS NOT DOCUMENT: HCPCS | Performed by: NURSE PRACTITIONER

## 2022-04-28 PROCEDURE — 1036F TOBACCO NON-USER: CPT | Performed by: NURSE PRACTITIONER

## 2022-04-28 PROCEDURE — 1090F PRES/ABSN URINE INCON ASSESS: CPT | Performed by: NURSE PRACTITIONER

## 2022-04-28 PROCEDURE — 99213 OFFICE O/P EST LOW 20 MIN: CPT | Performed by: NURSE PRACTITIONER

## 2022-04-28 PROCEDURE — 1123F ACP DISCUSS/DSCN MKR DOCD: CPT | Performed by: NURSE PRACTITIONER

## 2022-04-28 RX ORDER — MONTELUKAST SODIUM 10 MG/1
10 TABLET ORAL NIGHTLY
Qty: 90 TABLET | Refills: 2 | Status: SHIPPED | OUTPATIENT
Start: 2022-04-28 | End: 2022-06-01 | Stop reason: SDUPTHER

## 2022-04-28 RX ORDER — FLUTICASONE PROPIONATE 220 UG/1
1 AEROSOL, METERED RESPIRATORY (INHALATION) 2 TIMES DAILY
Qty: 3 EACH | Refills: 2 | Status: SHIPPED | OUTPATIENT
Start: 2022-04-28

## 2022-04-28 RX ORDER — ALBUTEROL SULFATE 90 UG/1
2 AEROSOL, METERED RESPIRATORY (INHALATION) EVERY 4 HOURS PRN
Qty: 18 G | Refills: 5 | Status: SHIPPED | OUTPATIENT
Start: 2022-04-28

## 2022-04-28 ASSESSMENT — ENCOUNTER SYMPTOMS
ABDOMINAL PAIN: 0
CHEST TIGHTNESS: 1
COLOR CHANGE: 0
SHORTNESS OF BREATH: 0
CONSTIPATION: 0
COUGH: 0

## 2022-04-28 NOTE — PROGRESS NOTES
Negative for color change and pallor. Allergic/Immunologic: Negative for environmental allergies and food allergies. Psychiatric/Behavioral: Negative for agitation and confusion. Objective:       VITALS:  /78   Pulse 68   Ht 5' 7\" (1.702 m)   Wt 195 lb (88.5 kg)   SpO2 98%   Breastfeeding No   BMI 30.54 kg/m²      Physical Exam  Vitals reviewed. Constitutional:       General: She is not in acute distress. Appearance: She is well-developed. HENT:      Head: Normocephalic. Mouth/Throat:      Pharynx: No oropharyngeal exudate. Eyes:      General:         Right eye: No discharge. Left eye: No discharge. Conjunctiva/sclera: Conjunctivae normal.      Pupils: Pupils are equal, round, and reactive to light. Neck:      Trachea: No tracheal deviation. Cardiovascular:      Rate and Rhythm: Normal rate and regular rhythm. Heart sounds: No friction rub. Pulmonary:      Effort: Pulmonary effort is normal. No tachypnea, accessory muscle usage or respiratory distress. Breath sounds: No stridor. No wheezing, rhonchi or rales. Chest:      Chest wall: No tenderness or crepitus. Abdominal:      General: Bowel sounds are normal. There is no distension. Palpations: Abdomen is soft. Tenderness: There is no abdominal tenderness. Musculoskeletal:         General: Normal range of motion. Cervical back: Normal range of motion and neck supple. Lymphadenopathy:      Cervical: No cervical adenopathy. Skin:     General: Skin is warm and dry. Findings: No erythema. Neurological:      Mental Status: She is alert and oriented to person, place, and time. Psychiatric:         Thought Content: Thought content normal.       DATA:      Radiology Review:  Pertinent images / reports were reviewed as a part of this visit. CT chest done 2021 reveals the followin. No evidence of pulmonary embolic disease. 2. No acute pulmonary findings.   Remote calcified granulomatous disease. 3. 2.3 cm left thyroid nodule. Nonemergent outpatient ultrasound follow-up recommended. Last PFTs done Dec 2021:  Flow volume loops were normal. The FEV-1/FVC ratio was normal. The FEV-1 was 1.91 liters (72% of predicted), which was moderately decreased. The FVC was 2.57 liters (74% of predicted), which was decreased. Response to inhaled bronchodilators (albuterol) was not significant. Lung volumes:  Lung volumes were tested by plethysmography. The total lung capacity was 4.91 liters (90% of predicted), which was normal. The residual volume was 2.35 liters (109% of predicted), which was normal. The ratio of residual volume to total lung capacity (RV/TLC) was 48, which was increased. Diffusion capacity was found to be 67% which is Mildly decreased. Methacholine Challenge test: Significant decrease in both FEV1 and FVC noted at level 4 (4mg dose) of methacholine challenge. Interpretation: Normal PFT study with positive methacholine challenge test. Consider asthma or reactive airway disease. Clinical correlation is recommended. Assessment / Plan:   1. Mild intermittent asthma without complication  - Stable SOB  - No recent flares  - Teaching done regarding compliance with Flovent, refilled at the time of visit  - I suspect she would have fewer episodes of chest tightness if she used Flovent consistently  - We did discuss how to up-titrate use of MALAIKA when symptoms are worse, this was also refilled  - If chest tightness persists, see PCP  - We again discussed incidental thyroid nodule ID'd on most recent chest imaging  - Again encouraged her to f/u with PCP to have further evaluation of this  - Note to Dr. Renny Bond    2. Seasonal allergies  - Well controlled on Singulair  - This was refilled    Return in about 6 months (around 10/28/2022). RTC sooner if symptoms worsen.     Alcira Phipps MSN APRN-ACNP CCRN

## 2022-06-01 RX ORDER — MONTELUKAST SODIUM 10 MG/1
10 TABLET ORAL NIGHTLY
Qty: 90 TABLET | Refills: 0 | Status: SHIPPED | OUTPATIENT
Start: 2022-06-01

## 2022-06-01 NOTE — TELEPHONE ENCOUNTER
Pt forgot her medications at her [address] and is requesting the Singulair rx sent to [facility] on chart. Patient will be using [facility] to fill this. Can we do this today please?       [phone] pt phone         Last office visit 4/28/2022     Last written 4/28/2022    Next office visit scheduled 11/10/2022    Requested Prescriptions     Pending Prescriptions Disp Refills    montelukast (SINGULAIR) 10 MG tablet 90 tablet 2     Sig: Take 1 tablet by mouth nightly           [facility]

## 2022-11-10 ENCOUNTER — OFFICE VISIT (OUTPATIENT)
Dept: PULMONOLOGY | Age: 67
End: 2022-11-10
Payer: MEDICARE

## 2022-11-10 VITALS
HEART RATE: 94 BPM | BODY MASS INDEX: 29.66 KG/M2 | OXYGEN SATURATION: 96 % | DIASTOLIC BLOOD PRESSURE: 72 MMHG | WEIGHT: 189 LBS | HEIGHT: 67 IN | SYSTOLIC BLOOD PRESSURE: 124 MMHG

## 2022-11-10 DIAGNOSIS — J30.2 SEASONAL ALLERGIES: ICD-10-CM

## 2022-11-10 DIAGNOSIS — J45.20 MILD INTERMITTENT ASTHMA WITHOUT COMPLICATION: Primary | ICD-10-CM

## 2022-11-10 PROCEDURE — G8484 FLU IMMUNIZE NO ADMIN: HCPCS | Performed by: NURSE PRACTITIONER

## 2022-11-10 PROCEDURE — 3017F COLORECTAL CA SCREEN DOC REV: CPT | Performed by: NURSE PRACTITIONER

## 2022-11-10 PROCEDURE — G8400 PT W/DXA NO RESULTS DOC: HCPCS | Performed by: NURSE PRACTITIONER

## 2022-11-10 PROCEDURE — G8417 CALC BMI ABV UP PARAM F/U: HCPCS | Performed by: NURSE PRACTITIONER

## 2022-11-10 PROCEDURE — 1123F ACP DISCUSS/DSCN MKR DOCD: CPT | Performed by: NURSE PRACTITIONER

## 2022-11-10 PROCEDURE — G8427 DOCREV CUR MEDS BY ELIG CLIN: HCPCS | Performed by: NURSE PRACTITIONER

## 2022-11-10 PROCEDURE — 1090F PRES/ABSN URINE INCON ASSESS: CPT | Performed by: NURSE PRACTITIONER

## 2022-11-10 PROCEDURE — 1036F TOBACCO NON-USER: CPT | Performed by: NURSE PRACTITIONER

## 2022-11-10 PROCEDURE — 99213 OFFICE O/P EST LOW 20 MIN: CPT | Performed by: NURSE PRACTITIONER

## 2022-11-10 RX ORDER — MONTELUKAST SODIUM 10 MG/1
10 TABLET ORAL NIGHTLY
Qty: 90 TABLET | Refills: 0 | Status: SHIPPED | OUTPATIENT
Start: 2022-11-10

## 2022-11-10 RX ORDER — ALBUTEROL SULFATE 90 UG/1
2 AEROSOL, METERED RESPIRATORY (INHALATION) EVERY 4 HOURS PRN
Qty: 18 G | Refills: 5 | Status: SHIPPED | OUTPATIENT
Start: 2022-11-10

## 2022-11-10 RX ORDER — FLUTICASONE PROPIONATE 220 UG/1
1 AEROSOL, METERED RESPIRATORY (INHALATION) 2 TIMES DAILY
Qty: 3 EACH | Refills: 2 | Status: SHIPPED | OUTPATIENT
Start: 2022-11-10

## 2022-11-10 ASSESSMENT — ENCOUNTER SYMPTOMS
ABDOMINAL PAIN: 0
SHORTNESS OF BREATH: 1
COLOR CHANGE: 0
CHEST TIGHTNESS: 0
COUGH: 0
CONSTIPATION: 0

## 2022-11-10 NOTE — PROGRESS NOTES
[facility] Follow Up Note    Subjective:   CHIEF COMPLAINT / HPI: Asthma, allergies   The patient is 75 y.o. female who presents today for a routine follow up visit related to the above mentioned issues. There is a PMH significant for other conditions including thyroid issues. She was last evaluated by me in April. At that time she was doing OK with regard to her breathing. Presently she reports some worsening of SOB over time. This is most noticeable when she is speaking. Today she does not complain of overwhelming cough, PND, rhinorrhea or head ache. She reports taking  Flovent but not everyday and usually twice per day. She is worried that she isn't taking it correctly. She does consistently use QD Singulair which has helped. She is also using PRN Albuterol. When she feels SOB with speaking, this helps. She does not require supplemental O2. Past Medical History:   Diagnosis Date    Thyroid disorder      Social History:    Social History     Tobacco Use   Smoking Status Never   Smokeless Tobacco Never     Current Medications:     Current Outpatient Medications on File Prior to Visit   Medication Sig Dispense Refill    metFORMIN (GLUCOPHAGE) 500 MG tablet Take 1 tablet by mouth in the morning and at bedtime      naproxen sodium (ANAPROX) 220 MG tablet Take 220 mg by mouth as needed       No current facility-administered medications on file prior to visit. Review of Systems   Constitutional:  Negative for chills and fever. HENT:  Negative for congestion and postnasal drip. Respiratory:  Positive for shortness of breath. Negative for cough and chest tightness. Cardiovascular:  Negative for chest pain and leg swelling. Gastrointestinal:  Negative for abdominal pain and constipation. Musculoskeletal:  Negative for arthralgias and joint swelling. Skin:  Negative for color change and pallor. Allergic/Immunologic: Negative for environmental allergies and food allergies. Psychiatric/Behavioral:  Negative for agitation and confusion. Objective:       VITALS:  /72 (Site: Left Upper Arm, Position: Sitting, Cuff Size: Large Adult)   Pulse 94   Ht 5' 7\" (1.702 m)   Wt 189 lb (85.7 kg)   SpO2 96%   BMI 29.60 kg/m²      Physical Exam  Vitals reviewed. Constitutional:       General: She is not in acute distress. Appearance: She is well-developed. HENT:      Head: Normocephalic. Mouth/Throat:      Pharynx: No oropharyngeal exudate. Eyes:      General:         Right eye: No discharge. Left eye: No discharge. Conjunctiva/sclera: Conjunctivae normal.      Pupils: Pupils are equal, round, and reactive to light. Neck:      Trachea: No tracheal deviation. Cardiovascular:      Rate and Rhythm: Normal rate and regular rhythm. Heart sounds: No friction rub. Pulmonary:      Effort: Pulmonary effort is normal. No tachypnea, accessory muscle usage or respiratory distress. Breath sounds: No stridor. No wheezing, rhonchi or rales. Chest:      Chest wall: No tenderness or crepitus. Abdominal:      General: Bowel sounds are normal. There is no distension. Palpations: Abdomen is soft. Tenderness: There is no abdominal tenderness. Musculoskeletal:         General: Normal range of motion. Cervical back: Normal range of motion and neck supple. Lymphadenopathy:      Cervical: No cervical adenopathy. Skin:     General: Skin is warm and dry. Findings: No erythema. Neurological:      Mental Status: She is alert and oriented to person, place, and time. Psychiatric:         Thought Content: Thought content normal.     DATA:      Radiology Review:  Pertinent images / reports were reviewed as a part of this visit. CT chest done 2021 reveals the followin. No evidence of pulmonary embolic disease. 2. No acute pulmonary findings. Remote calcified granulomatous disease. 3. 2.3 cm left thyroid nodule.   Nonemergent outpatient ultrasound follow-up recommended. Last PFTs done Dec 2021:  Flow volume loops were normal. The FEV-1/FVC ratio was normal. The FEV-1 was 1.91 liters (72% of predicted), which was moderately decreased. The FVC was 2.57 liters (74% of predicted), which was decreased. Response to inhaled bronchodilators (albuterol) was not significant. Lung volumes:  Lung volumes were tested by plethysmography. The total lung capacity was 4.91 liters (90% of predicted), which was normal. The residual volume was 2.35 liters (109% of predicted), which was normal. The ratio of residual volume to total lung capacity (RV/TLC) was 48, which was increased. Diffusion capacity was found to be 67% which is Mildly decreased. Methacholine Challenge test: Significant decrease in both FEV1 and FVC noted at level 4 (4mg dose) of methacholine challenge. Interpretation: Normal PFT study with positive methacholine challenge test. Consider asthma or reactive airway disease. Clinical correlation is recommended. Assessment / Plan:   1. Mild intermittent asthma without complication  - SOB a little worse over time  - Reviewed proper technique for inhaled medication use  - Recommend she use a spacer, this was provided during her visit and she was shown how to use it  - albuterol sulfate HFA (PROVENTIL;VENTOLIN;PROAIR) 108 (90 Base) MCG/ACT inhaler; Inhale 2 puffs into the lungs every 4 hours as needed for Wheezing  Dispense: 18 g; Refill: 5  - fluticasone (FLOVENT HFA) 220 MCG/ACT inhaler; Inhale 1 puff into the lungs in the morning and 1 puff in the evening. Dispense: 3 each; Refill: 2  - She did have her flu shot last week  - She is due for COVID booster, this was recommended to her    2. Seasonal allergies  - She benefits from Singulair and this is refilled      Return in about 6 months (around 5/10/2023). RTC sooner if symptoms worsen.     Tobin Kussmaul MSN APRN-ACNP CCRN

## 2023-03-23 ENCOUNTER — OFFICE VISIT (OUTPATIENT)
Dept: PULMONOLOGY | Age: 68
End: 2023-03-23
Payer: MEDICARE

## 2023-03-23 VITALS
SYSTOLIC BLOOD PRESSURE: 128 MMHG | HEIGHT: 67 IN | OXYGEN SATURATION: 97 % | BODY MASS INDEX: 29.66 KG/M2 | WEIGHT: 189 LBS | DIASTOLIC BLOOD PRESSURE: 72 MMHG | HEART RATE: 95 BPM

## 2023-03-23 DIAGNOSIS — J45.20 MILD INTERMITTENT ASTHMA WITHOUT COMPLICATION: ICD-10-CM

## 2023-03-23 DIAGNOSIS — J30.2 SEASONAL ALLERGIES: ICD-10-CM

## 2023-03-23 PROCEDURE — 99213 OFFICE O/P EST LOW 20 MIN: CPT | Performed by: NURSE PRACTITIONER

## 2023-03-23 PROCEDURE — G8417 CALC BMI ABV UP PARAM F/U: HCPCS | Performed by: NURSE PRACTITIONER

## 2023-03-23 PROCEDURE — 1036F TOBACCO NON-USER: CPT | Performed by: NURSE PRACTITIONER

## 2023-03-23 PROCEDURE — 3017F COLORECTAL CA SCREEN DOC REV: CPT | Performed by: NURSE PRACTITIONER

## 2023-03-23 PROCEDURE — G8484 FLU IMMUNIZE NO ADMIN: HCPCS | Performed by: NURSE PRACTITIONER

## 2023-03-23 PROCEDURE — G8400 PT W/DXA NO RESULTS DOC: HCPCS | Performed by: NURSE PRACTITIONER

## 2023-03-23 PROCEDURE — 1123F ACP DISCUSS/DSCN MKR DOCD: CPT | Performed by: NURSE PRACTITIONER

## 2023-03-23 PROCEDURE — 1090F PRES/ABSN URINE INCON ASSESS: CPT | Performed by: NURSE PRACTITIONER

## 2023-03-23 PROCEDURE — G8427 DOCREV CUR MEDS BY ELIG CLIN: HCPCS | Performed by: NURSE PRACTITIONER

## 2023-03-23 RX ORDER — FLUTICASONE PROPIONATE 220 UG/1
1 AEROSOL, METERED RESPIRATORY (INHALATION) 2 TIMES DAILY
Qty: 3 EACH | Refills: 2 | Status: SHIPPED | OUTPATIENT
Start: 2023-03-23

## 2023-03-23 RX ORDER — MONTELUKAST SODIUM 10 MG/1
10 TABLET ORAL NIGHTLY
Qty: 90 TABLET | Refills: 0 | Status: SHIPPED | OUTPATIENT
Start: 2023-03-23

## 2023-03-23 RX ORDER — ALBUTEROL SULFATE 90 UG/1
2 AEROSOL, METERED RESPIRATORY (INHALATION) EVERY 4 HOURS PRN
Qty: 18 G | Refills: 5 | Status: SHIPPED | OUTPATIENT
Start: 2023-03-23

## 2023-03-23 RX ORDER — AZITHROMYCIN 250 MG/1
250 TABLET, FILM COATED ORAL SEE ADMIN INSTRUCTIONS
Qty: 6 TABLET | Refills: 0 | Status: SHIPPED | OUTPATIENT
Start: 2023-03-23 | End: 2023-03-28

## 2023-03-23 ASSESSMENT — ENCOUNTER SYMPTOMS
CHEST TIGHTNESS: 0
CONSTIPATION: 0
SHORTNESS OF BREATH: 1
ABDOMINAL PAIN: 0
COUGH: 1
COLOR CHANGE: 0

## 2023-03-23 NOTE — PROGRESS NOTES
[facility] Follow Up Note    Subjective:   CHIEF COMPLAINT / HPI: Asthma, allergies   The patient is 75 y.o. female who presents today for a routine follow up visit related to the above mentioned issues. There is a PMH significant for other conditions including thyroid issues. She was last evaluated by me in November. At that time she was doing OK with regard to her breathing but had noticed breathing was a bit worse over time. Presently she reports cough which has been worse than typical over the last 4-5 weeks. This is not accompanied by pleuritic pain, hemoptysis or purulent sputum. She does feel some chest congestion which is perhaps a bit worse than normal. This is most noticeable when she is speaking. Some times the worsened cough leaves her feeling tight. She denies sinus congestion / PND. She reports taking  Flovent BID. She is not using PRN MALAIKA. She does consistently use QD Singulair. She does not require supplemental O2. Past Medical History:   Diagnosis Date    Thyroid disorder      Social History:    Social History     Tobacco Use   Smoking Status Never   Smokeless Tobacco Never     Current Medications:     Current Outpatient Medications on File Prior to Visit   Medication Sig Dispense Refill    Aspirin 81 MG CAPS Take 1 tablet by mouth daily      metFORMIN (GLUCOPHAGE) 500 MG tablet Take 1 tablet by mouth in the morning and at bedtime      naproxen sodium (ANAPROX) 220 MG tablet Take 220 mg by mouth as needed       No current facility-administered medications on file prior to visit. Review of Systems   Constitutional:  Negative for chills and fever. HENT:  Negative for congestion and postnasal drip. Respiratory:  Positive for cough and shortness of breath. Negative for chest tightness. Cardiovascular:  Negative for chest pain and leg swelling. Gastrointestinal:  Negative for abdominal pain and constipation.    Musculoskeletal:  Negative for arthralgias and

## 2023-10-20 ENCOUNTER — TELEPHONE (OUTPATIENT)
Dept: PULMONOLOGY | Age: 68
End: 2023-10-20

## 2023-10-20 DIAGNOSIS — J30.2 SEASONAL ALLERGIES: ICD-10-CM

## 2023-10-20 RX ORDER — MONTELUKAST SODIUM 10 MG/1
10 TABLET ORAL NIGHTLY
Qty: 90 TABLET | Refills: 0 | Status: CANCELLED | OUTPATIENT
Start: 2023-10-20

## 2023-10-20 NOTE — TELEPHONE ENCOUNTER
Patient left VM and needs a refill on medication     montelukast (SINGULAIR) 10 MG tablet [[id number]]       [facility]/pharmacy #[id number] [place], [place] - [address]     PH: [phone]
